# Patient Record
Sex: FEMALE | Race: WHITE | NOT HISPANIC OR LATINO | URBAN - NONMETROPOLITAN AREA
[De-identification: names, ages, dates, MRNs, and addresses within clinical notes are randomized per-mention and may not be internally consistent; named-entity substitution may affect disease eponyms.]

---

## 2023-08-29 ENCOUNTER — APPOINTMENT (OUTPATIENT)
Dept: PRIMARY CARE | Facility: CLINIC | Age: 23
End: 2023-08-29
Payer: COMMERCIAL

## 2023-09-01 NOTE — PROGRESS NOTES
Subjective   Patient ID: Nai Hutton is a 22 y.o. female who presents for Establish Care (Birth control refill; always in pain, joints; ).    HPI   Moved to [facility] in November.     Would like a refill on her birth control, Awilda     Multiple joint pains: She went and saw rheumatology before. Dr. Jimenez in [facility]. She gets more flair of pain in her body with exercise. Family members have fibromyalgia. She was given flexeril and found that it would interact with her medication and could cause seizure.   She has been doing PT. Doing stretches and this has not been helping too much.   She was told that she has jaw issues.   Hypermobility, having issues pain with flexion.   Feels like her head is very heavy all the time. Holding her head up all the time is hard.   Has seen a chiropractor in the past, gets minimal relief.   Has issues with tight    Ibuprofen 800mg and this is not helping much, would be open to trying flexeril     She does not see psych. Depression/anxiety.     Hx of Malignant Hyperthermia     All other systems reviewed and negative for complaint unless stated above.    Surgery: Hernia repair   Family: MH, Mother   smoker: none    Etoh: occasional   Drug use: none     Review of Systems   Constitutional:  Negative for chills and fever.   HENT:  Negative for congestion, ear pain and rhinorrhea.    Eyes:  Negative for discharge and redness.   Respiratory:  Negative for cough, shortness of breath and wheezing.    Cardiovascular:  Negative for chest pain and leg swelling.   Gastrointestinal:  Negative for abdominal pain, constipation, diarrhea, nausea and vomiting.   Genitourinary:  Negative for difficulty urinating, frequency and urgency.   Musculoskeletal:  Negative for gait problem.   Skin:  Negative for rash and wound.   Neurological:  Negative for dizziness, weakness and headaches.   Psychiatric/Behavioral:  Negative for confusion. The patient is not nervous/anxious.        Objective   BP  "118/70 (BP Location: Right arm, Patient Position: Sitting, BP Cuff Size: Adult)   Pulse 88   Ht 1.655 m (5' 5.16\")   Wt 72.6 kg (160 lb)   SpO2 99%   BMI 26.50 kg/m²     Physical Exam  Vitals reviewed.   Constitutional:       Appearance: Normal appearance.   HENT:      Head: Normocephalic.      Right Ear: Tympanic membrane, ear canal and external ear normal.      Left Ear: Tympanic membrane, ear canal and external ear normal.      Nose: No rhinorrhea.      Mouth/Throat:      Mouth: Mucous membranes are moist.      Pharynx: Oropharynx is clear.   Eyes:      Pupils: Pupils are equal, round, and reactive to light.   Cardiovascular:      Rate and Rhythm: Normal rate and regular rhythm.      Pulses: Normal pulses.   Pulmonary:      Effort: Pulmonary effort is normal.      Breath sounds: Normal breath sounds.   Abdominal:      General: Abdomen is flat. Bowel sounds are normal.      Palpations: Abdomen is soft.   Musculoskeletal:         General: No tenderness. Normal range of motion.      Right lower leg: No edema.      Left lower leg: No edema.      Comments: Hypermobility in wrists, can flex at waist with palms on floor    Lymphadenopathy:      Cervical: No cervical adenopathy.   Skin:     General: Skin is warm and dry.      Findings: No rash.   Neurological:      Mental Status: She is alert and oriented to person, place, and time.   Psychiatric:         Mood and Affect: Mood normal.         Behavior: Behavior normal.         Assessment/Plan   Diagnoses and all orders for this visit:  Annual physical exam  -     Comprehensive Metabolic Panel; Future  -     Lipid Panel; Future  Pain in other joint  -     TSH with reflex to Free T4 if abnormal; Future  -     CBC and Auto Differential; Future  -     Comprehensive Metabolic Panel; Future  -     RENATA + GLENDY Panel; Future  -     C-reactive protein; Future  -     Sedimentation Rate; Future  -     HLAB27 Typing; Future  -     cyclobenzaprine (Flexeril) 5 mg tablet; Take 1 " tablet (5 mg) by mouth as needed at bedtime for muscle spasms.  Encounter for surveillance of contraceptive pills  -     drospirenone-ethinyl estradioL (Kera Kaiser) 3-0.02 mg tablet; Take 1 tablet by mouth once daily.

## 2023-09-05 ENCOUNTER — OFFICE VISIT (OUTPATIENT)
Dept: PRIMARY CARE | Facility: CLINIC | Age: 23
End: 2023-09-05
Payer: COMMERCIAL

## 2023-09-05 VITALS
HEIGHT: 65 IN | BODY MASS INDEX: 26.66 KG/M2 | SYSTOLIC BLOOD PRESSURE: 118 MMHG | DIASTOLIC BLOOD PRESSURE: 70 MMHG | HEART RATE: 88 BPM | WEIGHT: 160 LBS | OXYGEN SATURATION: 99 %

## 2023-09-05 DIAGNOSIS — M25.59 PAIN IN OTHER JOINT: ICD-10-CM

## 2023-09-05 DIAGNOSIS — Z30.41 ENCOUNTER FOR SURVEILLANCE OF CONTRACEPTIVE PILLS: ICD-10-CM

## 2023-09-05 DIAGNOSIS — Z00.00 ANNUAL PHYSICAL EXAM: Primary | ICD-10-CM

## 2023-09-05 PROCEDURE — 99204 OFFICE O/P NEW MOD 45 MIN: CPT | Performed by: REGISTERED NURSE

## 2023-09-05 PROCEDURE — 1036F TOBACCO NON-USER: CPT | Performed by: REGISTERED NURSE

## 2023-09-05 RX ORDER — DROSPIRENONE AND ETHINYL ESTRADIOL 0.02-3(28)
KIT ORAL
COMMUNITY
End: 2023-09-07

## 2023-09-05 RX ORDER — DROSPIRENONE AND ETHINYL ESTRADIOL 0.02-3(28)
1 KIT ORAL DAILY
COMMUNITY
End: 2023-09-05 | Stop reason: SDUPTHER

## 2023-09-05 RX ORDER — LAMOTRIGINE 25 MG/1
TABLET ORAL
COMMUNITY
Start: 2023-03-07 | End: 2023-10-10 | Stop reason: ALTCHOICE

## 2023-09-05 RX ORDER — LAMOTRIGINE 100 MG/1
1 TABLET ORAL DAILY
COMMUNITY
Start: 2023-04-07 | End: 2023-10-10 | Stop reason: ALTCHOICE

## 2023-09-05 RX ORDER — BUPROPION HYDROCHLORIDE 300 MG/1
300 TABLET ORAL
COMMUNITY
Start: 2020-10-22 | End: 2023-10-10 | Stop reason: ALTCHOICE

## 2023-09-05 RX ORDER — FLUOXETINE HYDROCHLORIDE 20 MG/1
20 CAPSULE ORAL DAILY
COMMUNITY
Start: 2023-01-14 | End: 2023-10-10 | Stop reason: ALTCHOICE

## 2023-09-05 RX ORDER — DROSPIRENONE AND ETHINYL ESTRADIOL 0.02-3(28)
1 KIT ORAL DAILY
Qty: 28 TABLET | Refills: 12 | Status: SHIPPED | OUTPATIENT
Start: 2023-09-05 | End: 2023-09-07

## 2023-09-05 RX ORDER — BUSPIRONE HYDROCHLORIDE 5 MG/1
5 TABLET ORAL
COMMUNITY
Start: 2023-01-14 | End: 2023-10-10 | Stop reason: ALTCHOICE

## 2023-09-05 RX ORDER — LEVONORGESTREL / ETHINYL ESTRADIOL AND ETHINYL ESTRADIOL 150-30(84)
1 KIT ORAL DAILY
COMMUNITY
Start: 2021-11-18 | End: 2023-10-10 | Stop reason: ALTCHOICE

## 2023-09-05 RX ORDER — CYCLOBENZAPRINE HCL 5 MG
5 TABLET ORAL NIGHTLY PRN
Qty: 30 TABLET | Refills: 0 | Status: SHIPPED | OUTPATIENT
Start: 2023-09-05 | End: 2023-11-10 | Stop reason: ALTCHOICE

## 2023-09-05 RX ORDER — ALBUTEROL SULFATE 90 UG/1
AEROSOL, METERED RESPIRATORY (INHALATION)
COMMUNITY
Start: 2022-10-04 | End: 2023-10-10 | Stop reason: ALTCHOICE

## 2023-09-05 ASSESSMENT — ENCOUNTER SYMPTOMS
COUGH: 0
DIARRHEA: 0
NERVOUS/ANXIOUS: 0
ABDOMINAL PAIN: 0
FEVER: 0
CHILLS: 0
WOUND: 0
EYE REDNESS: 0
CONSTIPATION: 0
HEADACHES: 0
WHEEZING: 0
RHINORRHEA: 0
CONFUSION: 0
DIFFICULTY URINATING: 0
VOMITING: 0
WEAKNESS: 0
EYE DISCHARGE: 0
FREQUENCY: 0
DIZZINESS: 0
NAUSEA: 0
SHORTNESS OF BREATH: 0

## 2023-09-07 ENCOUNTER — LAB (OUTPATIENT)
Dept: LAB | Facility: LAB | Age: 23
End: 2023-09-07
Payer: COMMERCIAL

## 2023-09-07 DIAGNOSIS — Z30.41 ENCOUNTER FOR SURVEILLANCE OF CONTRACEPTIVE PILLS: ICD-10-CM

## 2023-09-07 DIAGNOSIS — Z00.00 ANNUAL PHYSICAL EXAM: ICD-10-CM

## 2023-09-07 DIAGNOSIS — M25.59 PAIN IN OTHER JOINT: ICD-10-CM

## 2023-09-07 LAB
ALANINE AMINOTRANSFERASE (SGPT) (U/L) IN SER/PLAS: 10 U/L (ref 7–45)
ALBUMIN (G/DL) IN SER/PLAS: 4 G/DL (ref 3.4–5)
ALKALINE PHOSPHATASE (U/L) IN SER/PLAS: 38 U/L (ref 33–110)
ANION GAP IN SER/PLAS: 9 MMOL/L (ref 10–20)
ASPARTATE AMINOTRANSFERASE (SGOT) (U/L) IN SER/PLAS: 14 U/L (ref 9–39)
BASOPHILS (10*3/UL) IN BLOOD BY AUTOMATED COUNT: 0.04 X10E9/L (ref 0–0.1)
BASOPHILS/100 LEUKOCYTES IN BLOOD BY AUTOMATED COUNT: 1 % (ref 0–2)
BILIRUBIN TOTAL (MG/DL) IN SER/PLAS: 0.3 MG/DL (ref 0–1.2)
C REACTIVE PROTEIN (MG/L) IN SER/PLAS: 0.67 MG/DL
CALCIUM (MG/DL) IN SER/PLAS: 8.9 MG/DL (ref 8.6–10.3)
CARBON DIOXIDE, TOTAL (MMOL/L) IN SER/PLAS: 29 MMOL/L (ref 21–32)
CHLORIDE (MMOL/L) IN SER/PLAS: 102 MMOL/L (ref 98–107)
CHOLESTEROL (MG/DL) IN SER/PLAS: 182 MG/DL (ref 0–199)
CHOLESTEROL IN HDL (MG/DL) IN SER/PLAS: 59.9 MG/DL
CHOLESTEROL/HDL RATIO: 3
CREATININE (MG/DL) IN SER/PLAS: 0.71 MG/DL (ref 0.5–1.05)
EOSINOPHILS (10*3/UL) IN BLOOD BY AUTOMATED COUNT: 0.23 X10E9/L (ref 0–0.7)
EOSINOPHILS/100 LEUKOCYTES IN BLOOD BY AUTOMATED COUNT: 5.8 % (ref 0–6)
ERYTHROCYTE DISTRIBUTION WIDTH (RATIO) BY AUTOMATED COUNT: 12.2 % (ref 11.5–14.5)
ERYTHROCYTE MEAN CORPUSCULAR HEMOGLOBIN CONCENTRATION (G/DL) BY AUTOMATED: 32.5 G/DL (ref 32–36)
ERYTHROCYTE MEAN CORPUSCULAR VOLUME (FL) BY AUTOMATED COUNT: 90 FL (ref 80–100)
ERYTHROCYTES (10*6/UL) IN BLOOD BY AUTOMATED COUNT: 4.32 X10E12/L (ref 4–5.2)
GFR FEMALE: >90 ML/MIN/1.73M2
GLUCOSE (MG/DL) IN SER/PLAS: 75 MG/DL (ref 74–99)
HEMATOCRIT (%) IN BLOOD BY AUTOMATED COUNT: 38.8 % (ref 36–46)
HEMOGLOBIN (G/DL) IN BLOOD: 12.6 G/DL (ref 12–16)
IMMATURE GRANULOCYTES/100 LEUKOCYTES IN BLOOD BY AUTOMATED COUNT: 0 % (ref 0–0.9)
LDL: 103 MG/DL (ref 0–119)
LEUKOCYTES (10*3/UL) IN BLOOD BY AUTOMATED COUNT: 4 X10E9/L (ref 4.4–11.3)
LYMPHOCYTES (10*3/UL) IN BLOOD BY AUTOMATED COUNT: 1.83 X10E9/L (ref 1.2–4.8)
LYMPHOCYTES/100 LEUKOCYTES IN BLOOD BY AUTOMATED COUNT: 45.9 % (ref 13–44)
MONOCYTES (10*3/UL) IN BLOOD BY AUTOMATED COUNT: 0.45 X10E9/L (ref 0.1–1)
MONOCYTES/100 LEUKOCYTES IN BLOOD BY AUTOMATED COUNT: 11.3 % (ref 2–10)
NEUTROPHILS (10*3/UL) IN BLOOD BY AUTOMATED COUNT: 1.44 X10E9/L (ref 1.2–7.7)
NEUTROPHILS/100 LEUKOCYTES IN BLOOD BY AUTOMATED COUNT: 36 % (ref 40–80)
PLATELETS (10*3/UL) IN BLOOD AUTOMATED COUNT: 282 X10E9/L (ref 150–450)
POTASSIUM (MMOL/L) IN SER/PLAS: 4.2 MMOL/L (ref 3.5–5.3)
PROTEIN TOTAL: 6.6 G/DL (ref 6.4–8.2)
SEDIMENTATION RATE, ERYTHROCYTE: 3 MM/H (ref 0–20)
SODIUM (MMOL/L) IN SER/PLAS: 136 MMOL/L (ref 136–145)
THYROTROPIN (MIU/L) IN SER/PLAS BY DETECTION LIMIT <= 0.05 MIU/L: 1.7 MIU/L (ref 0.44–3.98)
TRIGLYCERIDE (MG/DL) IN SER/PLAS: 96 MG/DL (ref 0–149)
UREA NITROGEN (MG/DL) IN SER/PLAS: 13 MG/DL (ref 6–23)
VLDL: 19 MG/DL (ref 0–40)

## 2023-09-07 PROCEDURE — 86140 C-REACTIVE PROTEIN: CPT

## 2023-09-07 PROCEDURE — 80053 COMPREHEN METABOLIC PANEL: CPT

## 2023-09-07 PROCEDURE — 86235 NUCLEAR ANTIGEN ANTIBODY: CPT

## 2023-09-07 PROCEDURE — 80061 LIPID PANEL: CPT

## 2023-09-07 PROCEDURE — 86038 ANTINUCLEAR ANTIBODIES: CPT

## 2023-09-07 PROCEDURE — 81381 HLA I TYPING 1 ALLELE HR: CPT

## 2023-09-07 PROCEDURE — 36415 COLL VENOUS BLD VENIPUNCTURE: CPT

## 2023-09-07 PROCEDURE — 85025 COMPLETE CBC W/AUTO DIFF WBC: CPT

## 2023-09-07 PROCEDURE — 84443 ASSAY THYROID STIM HORMONE: CPT

## 2023-09-07 PROCEDURE — 85652 RBC SED RATE AUTOMATED: CPT

## 2023-09-07 PROCEDURE — 86225 DNA ANTIBODY NATIVE: CPT

## 2023-09-07 RX ORDER — DROSPIRENONE AND ETHINYL ESTRADIOL 0.02-3(28)
1 KIT ORAL DAILY
Qty: 90 TABLET | Refills: 3 | Status: SHIPPED | OUTPATIENT
Start: 2023-09-07 | End: 2023-10-24 | Stop reason: SDUPTHER

## 2023-09-08 LAB
ANTI-CENTROMERE: <0.2 AI
ANTI-CHROMATIN: <0.2 AI
ANTI-DNA (DS): 1 IU/ML
ANTI-JO-1 IGG: <0.2 AI
ANTI-NUCLEAR ANTIBODY (ANA): NEGATIVE
ANTI-RIBOSOMAL P: <0.2 AI
ANTI-RNP: 0.3 AI
ANTI-SCL-70: <0.2 AI
ANTI-SM/RNP: <0.2 AI
ANTI-SM: <0.2 AI
ANTI-SSA: <0.2 AI
ANTI-SSB: <0.2 AI

## 2023-09-12 DIAGNOSIS — D72.9 ABNORMAL WHITE BLOOD CELL (WBC) COUNT: Primary | ICD-10-CM

## 2023-09-12 LAB — HLAB27 TYPING: NEGATIVE

## 2023-09-26 DIAGNOSIS — M24.9 HYPERMOBILITY OF JOINT: ICD-10-CM

## 2023-09-26 DIAGNOSIS — M25.59 PAIN IN OTHER JOINT: Primary | ICD-10-CM

## 2023-10-09 PROBLEM — I73.00 RAYNAUD DISEASE: Status: ACTIVE | Noted: 2023-01-01

## 2023-10-09 PROBLEM — F31.81 BIPOLAR 2 DISORDER (MULTI): Status: ACTIVE | Noted: 2019-11-13

## 2023-10-09 PROBLEM — N92.6 MENSTRUAL PERIODS IRREGULAR: Status: ACTIVE | Noted: 2019-11-13

## 2023-10-09 PROBLEM — M79.7 FIBROMYALGIA: Status: ACTIVE | Noted: 2023-04-05

## 2023-10-09 PROBLEM — R19.8 IRREGULAR BOWEL HABITS: Status: ACTIVE | Noted: 2019-09-17

## 2023-10-09 PROBLEM — N94.10 DYSPAREUNIA IN FEMALE: Status: ACTIVE | Noted: 2019-11-13

## 2023-10-09 PROBLEM — A60.9 ANOGENITAL HERPESVIRAL INFECTION: Status: ACTIVE | Noted: 2021-11-03

## 2023-10-09 PROBLEM — K21.9 GERD (GASTROESOPHAGEAL REFLUX DISEASE): Status: ACTIVE | Noted: 2019-09-17

## 2023-10-09 PROBLEM — F41.8 ANXIETY WITH DEPRESSION: Status: ACTIVE | Noted: 2023-02-16

## 2023-10-10 ENCOUNTER — OFFICE VISIT (OUTPATIENT)
Dept: GENETICS | Facility: HOSPITAL | Age: 23
End: 2023-10-10
Payer: COMMERCIAL

## 2023-10-10 VITALS
WEIGHT: 158.95 LBS | DIASTOLIC BLOOD PRESSURE: 69 MMHG | TEMPERATURE: 99.2 F | HEIGHT: 62 IN | RESPIRATION RATE: 20 BRPM | SYSTOLIC BLOOD PRESSURE: 113 MMHG | HEART RATE: 93 BPM | BODY MASS INDEX: 29.25 KG/M2

## 2023-10-10 DIAGNOSIS — M25.59 PAIN IN OTHER JOINT: Primary | ICD-10-CM

## 2023-10-10 DIAGNOSIS — M24.9 HYPERMOBILITY OF JOINT: ICD-10-CM

## 2023-10-10 DIAGNOSIS — R19.8 IRREGULAR BOWEL HABITS: ICD-10-CM

## 2023-10-10 DIAGNOSIS — F31.81 BIPOLAR 2 DISORDER (MULTI): ICD-10-CM

## 2023-10-10 DIAGNOSIS — M79.7 FIBROMYALGIA: ICD-10-CM

## 2023-10-10 DIAGNOSIS — R10.2 PELVIC PAIN: ICD-10-CM

## 2023-10-10 DIAGNOSIS — I73.00 RAYNAUD'S DISEASE WITHOUT GANGRENE: ICD-10-CM

## 2023-10-10 PROCEDURE — 99205 OFFICE O/P NEW HI 60 MIN: CPT | Performed by: MEDICAL GENETICS

## 2023-10-10 PROCEDURE — 1036F TOBACCO NON-USER: CPT | Performed by: MEDICAL GENETICS

## 2023-10-10 PROCEDURE — 99215 OFFICE O/P EST HI 40 MIN: CPT | Performed by: MEDICAL GENETICS

## 2023-10-10 NOTE — LETTER
10/11/23    PIERRE Valadez-CNP  [address] [facility] [address]      Dear PIERRE Grossman-CNP,    Thank you for referring your patient, Nai Hutton, to receive care in my office. I have enclosed a summary of the care provided to Nai on 10/11/23.    Please contact me with any questions you may have regarding the visit.    Sincerely,         Brittani Grande MD  [address] [address] [facility] [address]  [facility] [address]    CC: No Recipients

## 2023-10-10 NOTE — LETTER
10/11/23    MALORIE Valadez  [address] [facility] [address] [facility] [address]      Dear MALORIE Grossman,    I am writing to confirm that your patient, Nai Hutton  received care in my office on 10/11/23. I have enclosed a summary of the care provided to Nai for your reference.    Please contact me with any questions you may have regarding the visit.    Sincerely,         Brittani Grande MD  [address]  [facility] [address]  [facility] [address]    CC: No Recipients

## 2023-10-10 NOTE — PROGRESS NOTES
"MEDICAL GENETICS INITIAL VISIT NOTE    History of present illness:  The patient is being seen at the request of Dr. Mosqueda for genetic counseling and genetic evaluation.    -PT eval brought up the concern for hEDS.     -Always felt back pain since a young age, got sick easier. MRI showed spina bifida occulata ordered by Neuro due to tingling in hands. Told that this was not the reason for her issues.   GERD diagnosed early, meds did not help.  At 13y migraines monthly- put on OCP and migraine went away.  Cramps and pelvic pain all the time- tried pelvic floor therapy without any help  At 14y bladder does not feel it empty, has had incontinence    Fatigue all the time    Neck pain     Was diagnosed with bipolar, but patient thinks it was not a real diagnosis, thinks it could be autism.  Tried meds, DBT and bio feedback that did not help and currently not on any meds. Does not have a counselor or psychiatrist.    Feels that she has a hard time speaking to people.    Specialists/Previous Evaluations:  Rheum at [facility]-diagnosed with fibromyalgia, chronic fatigue syndrome    Surgeries/Hospitalizations:  2022 tubal ligation  Aug 2019- inpatient psych      Birth History:  GA: full term  Age of Mother: 21 ()  Age of Father: 21  Pregnancy: There were no abnormal ultrasounds or prenatal chromosomal screening results.   There were exposures to powdered metal,  no alcohol, tobacco, or street drug exposures in utero.  Delivery History:  born via vaginal  There were no delivery complications.  weighing normal   born at [facility] in [place].  -did not spend any time in the NICU.  M[place]ap Screen: Normal per report     Developmental history:  Wobbly and hit head a lot  Talked \"late\"   Grade HS, Associates degree in liberal arts  No regression.     Social History: lives with boyfriend.  works as a      Family History:   (paternal) and  (maternal) ethnicity.     Family history was " reviewed and the following concerns were apparent:  Father (45 years old)-healthy  Mother ( 45 years old)- fibromyalgia, fatigue, stomach ulcers, period issues.   Paternal half brother (7 years old)- healthy  MGF twin sister had malignant hyperthermia diagnosed with muscle biopsy as did her MGF. Her mother also carries the diagnosis.      The remainder of the family history was negative for birth defects, intellectual disability, recurrent pregnancy loss, or recognized inherited conditions. Consanguinity was denied. Ashkenazi Sabianism Ancestry was denied. The Pedigree is available for a full review of the family history.     PCP: Viktoriya Andujar    ROS: Pertinent negative and positive history related to inherited connective tissue disorders are as followings:     Musculoskeletal  - Hypermobility: Yes  - Joint laxity: Yes  - Joint dislocation: Yes  - Chronic widespread musculoskeletal pain: Yes  - Tendon/muscle rupture:  No  - Fractures: No  - Scoliosis: No mild  - Pectus differences: No  - Flat feet: No     Skin  - Spontaneous skin separation: No  - Easy bruising: Yes  - Skin hyperextensibility: No  - Stretch marks not related to weight change: No  - Abnormal scar formation, atrophic scar, wound dehiscence: No     Dental  - Dental crowding: Yes  - Gingival disease: Yes     Cardiopulmonary:  - Orthostatic intolerance: No  - Pneumothorax:  no  - Echocardiogram: No   - Spontaneous rupture/aneurysm/dissection of blood vessels: No  - chest pain  - heart rate goes up fast    GI/  - IBS-like symptoms: Yes seen by GI in 2016, endoscope was normal  - Hernia:  yes, not recurrent  - Rectal prolapse: No   - Pelvic/uterine prolapse: No  - Spontaneous rupture of internal organs: No  - Obstetric complications (e.g., significant vaginal tear, or postpartum hemorrhage): Not applicable    Ocular/ENT  - Lens dislocation:  No  - High myopia:  No  - Hearing loss:  Yes, not tested  - Tinnitus:  Yes    Neuropsychiatric:  -  Migraines/headaches:  Yes  - Neuropathy:  Yes    Pediatric history: No congenital hip dislocation, congenital club feet, hypotonia, developmental delay, autism.    Other medical issues include no       Physical examination:  Arm span to height ratio: 154/156- normal   Upper segment to lower segment ratio: not done  GENERAL: The patient is alert, in no apparent distress.  Head shape is normal.  Face: No malar hypoplasia. Forehead, nose, philthrum, and chin appear normal.  Eyes: Not deep set. Palpebral fissures normally positioned. Sclerae not blue or icteric.   Ears: Not dysplastic, posteriorly rotated, or low set.  Oral cavity: High arched palate. Yes crowding. Normal uvula, normal dentition, no receding gum line or gingivitis.  CHEST/LUNGS: No pectus differences.   ABDOMEN: No abdominal wall defect.   No arachnodactyly. Wrist signs negative. Thumb signs negative. Yes piezogenic papules. Acrogeria-. No joint contractures. No pretibial plaque. Hindfoot deformity no-, scoliosis- by forward bending test. No. No peripheral edema. No varicose veins.   SKIN: Stretch marks. Skin hyperextensibility no, no bruising, skin consistency not doughy or velvety. Not translucent. Scar keloid   Psychiatric: Cooperative. Appropriate mood and affect.     Beighton score for generalized joint hypermobility  1. Passive dorsiflexion and hyperextension of the fifth MCP joint beyond 90°: 2  2. Passive apposition of the thumb to the flexor aspect of the forearm: 2  3. Passive hyperextension of the elbow beyond 10°:  0  4. Passive hyperextension of the knee beyond 10°:  0  5. Active forward flexion of the trunk with the knees fully extended so that the palms of the hands rest flat on the floor:  1  Total  5/9 (where a score of equal to or more than  0 is considered positive for age)     Systemic score for Marfan syndrome is  0 where a score of equal to or more than 7 is considered positive systemic score.     Assessment:  Nai Ley  Brennen is a 22 y.o. female with phenotypes as outlined in the HPI and PE sections. Personal history is notable for pain and fatigue. Family history is notable for similar issues in mother.     There are 13 subtypes of Yolie Danlos Syndrome.     Classical EDS is typically characterized by skin hyperextensibility (absent), widened, atrophic scar (absent), and joint hypermobility as defined as at least 5 of 9 on Beighton scale (present). Minor features include abnormal wound healing (absent), velvet skin (absent), dislocations (absent), molluscoid pseudotumors and subcutaneous spheroids (absent), hypotonia, easy bruising (present), and surgical complications. Less common findings are mitral valve and tricuspid valve prolapse, aortic root dilation, and spontaneous rupture of large arteries. Classic Yolie-Danlos syndrome (cEDS) should be suspected in individuals with both of the following: Major criteria skin hyperextensibility and atrophic scarring with =3 minor criteria. About 90% the patients with Classical EDS will have mutations in COL5A1 or COL5A2. You have one of the concerning major features of Classic EDS. It is inherited in an autosomal dominant fashion. Affected individuals have an affected parent 50% of the time.    Vascular EDS is typically characterized by arterial rupture, intestinal rupture, uterine rupture during pregnancy, and a positive family history. You do not have a history of any of these features. Vascular EDS can be suspected when several of the minor features are present: thin, translucent skin, characteristic facial features including thin lips and philtrum, small chin, thin nose, and large eyes, acrogeria (premature aging of skin), arteriovenous carotid-cavernous sinus fistula, tendon or muscle rupture, early onset varicose veins, small joint hypermobility, pneumothorax/pneumohemothorax, easy bruising, chronic subluxation or dislocations, congenital hip dislocations, club feet, and  gingival recession. Of these features, you have  ??small joint hypermobility, and easy bruising. Vascular EDS is characterized by mutations in COL3A1 in at least 95% of patients while 2% have deletions in the gene. It is also inherited in an autosomal dominant fashion. 25% of people with vEDS have symptoms by age 20 and 80% will have symptoms by age 40. Your family history does not support this diagnosis. 50 % of individuals have a family history.     Hypermobile EDS (hEDS) is the most common subtype. The diagnostic criteria were revised in 2017 and requires all criterion should be met to establish a diagnosis of EDS, hypermobility type. These revised criteria are more strict than previous criteria. The genetic basis for hEDS is not known, so there is no genetic testing for hEDS. You do have features of hEDS, but do not fulfill criteria at this time.    Hypermobility spectrum disorders (HSD) are a group of conditions related to joint hypermobility (JH). Some people are hypermobile in all joints, some in large and some in small joints. This condition is thought to be distinct from hEDS, but there is no genetic testing for HSD. https://www.christy-danlos.com/what-is-hsd/ information provided.    The remaining subtypes have some defining features that are not present.    Some people with hypermobility can have issues with their heart, so we recommend a referral to cardiology for an echocardiogram.    If someone meets criteria for hEDS, the treatment is still symptomatic, so if pain is a main concern, can consider pain medicine referral. We discussed that physical therapy and aquatherapy can be helpful for pain management as well.    Genetic testing is an option. When someone does not fulfil clinical criteria, the chance for a positive result is low. There are also the chance for uncertain results.     Chronic pain can lead to issues with mental health. We recommend consideration of mental health consult. This is not to  say that the chronic pain is imagined and not real.     Plan:    Refer to cardiology for echo  Discuss with PCP pain management referral  National consultation service at [facility] information provided  Neuropsych testing for autism concern can be considered      Thank you for allowing me to participate in the care of this patient. Please do not hesitate to contact me if you have any questions.    Sincerely,     Brittani Grande MD  Medical Geneticist  [facility]  Phone: [phone]  Fax: [phone]   Address: [address] [place] [address]     ADDENDUM:    The family history was concerning for Malignant Hyperthermia  - We discussed that malignant hyperthermia (MH) is a condition of uncontrolled muscle contractions that can happen during or after a susceptible person receives a triggering drug.   - This can lead to elevation of body temperature, and dangerous muscle breakdown, with risk for heart rhythm disturbances, kidney damage, and muscle damage. This condition can sometimes be fatal.  - These episodes can usually be avoided by avoiding the triggering drugs, most of which are anesthetic agents.  - If it occurs, it can be treated with a medication called dantrolene.   - People who are susceptible to MH may not experience MH every time they receive anesthesia, and it is difficult to predict when they will have an episode.  - Therefore, AVOIDANCE OF THE TRIGGERING DRUGS IS RECOMMENDED ONCE THE SUSCEPTIBILITY IS KNOWN.  - This information should be shared with every surgeon and anesthesiologist treating you.   - It is generally recommended that people with MH susceptibility wear a medical alert bracelet in case they are unable to communicate their needs during a health emergency.     - Malignant hyperthermia susceptibility (MHS) is an autosomal dominant disorder (only need one harmful gene change to have the disorder).   - Most individuals diagnosed with MHS have a  parent with MHS, although the parent may not have experienced an episode of MH.   - The proportion of individuals with MHS caused by a de celena pathogenic variant (brand new harmful gene change) is unknown.   - Each child of an individual with MHS has a 50% chance of inheriting a causative gene change and the susceptibility to MH.  - There are three known genes that can have harmful changes and cause MHS.   - These three genes only account for ~70% of MHS, thus we discussed how there can be other variables or genes that we do not know yet that can cause an increased risk for these reactions/symptoms.   - Genetic testing is NOT able to detect a mutation in ~30% of individuals with MHS.    - We discussed that genetic testing of an affected individual is most informative. At this time, Nai should be treated as if she has malignant hyperthermia.   - She should have surgery at a location that meets the following criteria (according to the established guidelines by Malignant Hyperthermia Association of the [place] (MHAUS) and accrediting organizations):  ---- The facilities should be prepared to recognize and treat an MH crisis according to the established guidelines by (MHAUS) and accrediting organizations.  ---- Dantrolene should be accessible within ten minutes of the first signs of MH, and the facility should have the capacity to administer at least 10mg/kg of dantrolene in the event of an acute MH episode requiring multiple dantrolene doses to abort the crisis.  ---- The anesthesia machine should be flushed according to its specific manufacture's recommendations and/or charcoal filters placed on both inspiratory and expiratory limbs to minimize residual volatile agent in the circuit   ---- There should be a formal agreement in place between ambulatory surgery centers and hospitals for transfer of patients to higher care after a suspected MH episode.    - Testing is available through the [facility] laboratory for  IBIMP5Q, RYR1, and STAC3 genes.  The results may be positive, negative, or inconclusive.   - Testing takes 2 to 3 weeks.

## 2023-10-11 PROBLEM — R52 PAIN: Status: RESOLVED | Noted: 2023-10-11 | Resolved: 2023-10-11

## 2023-10-11 PROBLEM — M24.80 JOINT HYPEREXTENSIBILITY OF MULTIPLE SITES: Status: RESOLVED | Noted: 2023-10-11 | Resolved: 2023-10-11

## 2023-10-11 PROBLEM — M24.80 JOINT HYPEREXTENSIBILITY OF MULTIPLE SITES: Status: ACTIVE | Noted: 2023-10-11

## 2023-10-11 PROBLEM — M25.50 JOINT PAIN: Status: ACTIVE | Noted: 2023-10-11

## 2023-10-11 PROBLEM — R10.2 PELVIC PAIN: Status: ACTIVE | Noted: 2023-10-11

## 2023-10-11 PROBLEM — R52 PAIN: Status: ACTIVE | Noted: 2023-10-11

## 2023-10-13 DIAGNOSIS — M35.7 HYPERMOBILITY SYNDROME: ICD-10-CM

## 2023-10-19 ASSESSMENT — ENCOUNTER SYMPTOMS
BACK PAIN: 1
DYSURIA: 0
TINGLING: 1
HEADACHES: 1
PARESIS: 0
PARESTHESIAS: 0
WEIGHT LOSS: 0
PERIANAL NUMBNESS: 0
BOWEL INCONTINENCE: 0
NUMBNESS: 0
WEAKNESS: 0
LEG PAIN: 1
ABDOMINAL PAIN: 0
FEVER: 0

## 2023-10-20 NOTE — PROGRESS NOTES
Subjective   Patient ID: Nai Hutton is a 22 y.o. female who presents for Follow-up (Discuss blood work and flexeril; issues with bowls, either constipation or diarrhea; ).    Back Pain  This is a chronic problem. The current episode started more than 1 year ago. The problem occurs daily. The problem has been waxing and waning since onset. The pain is present in the thoracic spine. The quality of the pain is described as aching. The pain does not radiate. The pain is at a severity of 4/10. The pain is The same all the time. The symptoms are aggravated by position and sitting. Stiffness is present All day. Associated symptoms include headaches, leg pain and tingling. Pertinent negatives include no abdominal pain, bladder incontinence, bowel incontinence, chest pain, dysuria, fever, numbness, paresis, paresthesias, pelvic pain, perianal numbness, weakness or weight loss. Risk factors include poor posture.      Moved to [facility] in November.    She might be losing her insurance at the end of the year   Would like a refill on her birth control, Awilda      Hypermobility syndrome: She did see . They were unclear of ELD. She needs to get echo done. She had this scheduled but it was at [facility]. They did not want to finalize dx.   She has fluctuations in her heart rate. She is getting readings of 160 at times with minimal exertion. She does not feel like she is going to pass out.     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 She feels sob and she has felt like she is going to pass out with standing up. She was told to be evaluated with cardiology and possible Echo r/t hypermobility syndrome.     Previously:   Multiple joint pains: She went and saw rheumatology before. Dr. Jimenez in [facility]. She gets more flair of pain in her body with exercise. Family members have fibromyalgia. She was given flexeril and found that it would interact with her medication and could cause seizure.    She has been doing PT. Doing stretches and this has not been helping too much. She was told that she has jaw issues.   Hypermobility, having issues pain with flexion.   Feels like her head is very heavy all the time. Holding her head up all the time is hard. Has seen a chiropractor in the past, gets minimal relief.      Ibuprofen 800mg and this is not helping much, would be open to trying flexeril     Bouts of constipation and diarrhea.  Encouraged water intake. Encouraged fiber. She will be constipated for 5 days and then she will have diarrhea. Some mucus in her stool. Slimy. She had grainy stool today. Denies blood, today it was dark. Has always been irregular. She has always gone about 4 days. Worse over the past 4 months. She has tried fiber supplements in the past. She does not drink a lot of water. She has tried to change her diet. She tried factor meals. She a slight accident recently. She has done PT for her pelvic floor.   States that she has not tried a stool softener, she gets diarrhea.   She saw GI before. She thought she had celiac and was checked for this   She took prevacid in the past      She does not see psych. Depression/anxiety.      Hx of Malignant Hyperthermia      All other systems reviewed and negative for complaint unless stated above.     Surgery: Hernia repair   Family: MH, Mother   smoker: none     Etoh: occasional   Drug use: none       Review of Systems   Constitutional:  Negative for chills, fever and weight loss.   HENT:  Negative for congestion, ear pain and rhinorrhea.    Eyes:  Negative for discharge and redness.   Respiratory:  Negative for cough, shortness of breath and wheezing.    Cardiovascular:  Negative for chest pain and leg swelling.   Gastrointestinal:  Negative for abdominal pain, bowel incontinence, constipation, diarrhea, nausea and vomiting.   Genitourinary:  Negative for bladder incontinence, difficulty urinating, dysuria, frequency, pelvic pain and urgency.  "  Musculoskeletal:  Positive for back pain. Negative for gait problem.   Skin:  Negative for rash and wound.   Neurological:  Positive for tingling and headaches. Negative for dizziness, weakness, numbness and paresthesias.   Psychiatric/Behavioral:  Negative for confusion. The patient is not nervous/anxious.        Objective   /72 (BP Location: Right arm, Patient Position: Sitting, BP Cuff Size: Adult)   Pulse 73   Ht 1.585 m (5' 2.4\")   Wt 74.1 kg (163 lb 6.4 oz)   BMI 29.50 kg/m²     Physical Exam  Constitutional:       Appearance: Normal appearance.   Cardiovascular:      Rate and Rhythm: Normal rate and regular rhythm.   Pulmonary:      Effort: Pulmonary effort is normal.      Breath sounds: Normal breath sounds.   Skin:     General: Skin is warm and dry.   Neurological:      Mental Status: She is alert and oriented to person, place, and time. Mental status is at baseline.   Psychiatric:         Mood and Affect: Mood normal.         Behavior: Behavior normal.         Assessment/Plan       #Hypermobility syndrome   Getting echo for heart issues   Can continue flexeril     #GI issues   Discussed diet  Encouraged water  Encouraged fiber  FODMAP diet provided   May need to see GI in the future   Trial stool softeners for 1 week to help regulate bm cycle         Encounter for surveillance of contraceptive pills  -     drospirenone-ethinyl estradioL (Kera Kaiser) 3-0.02 mg tablet; Take 1 tablet by mouth once daily.      Answers submitted by the patient for this visit:  Back Pain Questionnaire (Submitted on 10/19/2023)  Chief Complaint: Back pain  Chronicity: chronic  Onset: more than 1 year ago  Frequency: daily  Progression since onset: waxing and waning  Pain location: thoracic spine  Pain quality: aching  Radiates to: does not radiate  Pain - numeric: 4/10  Pain is: the same all the time  Aggravated by: position, sitting  Stiffness is present: all day  abdominal pain: No  bladder incontinence: No  bowel " incontinence: No  chest pain: No  dysuria: No  fever: No  headaches: Yes  leg pain: Yes  numbness: No  paresis: No  paresthesias: No  pelvic pain: No  perianal numbness: No  tingling: Yes  weakness: No  weight loss: No  Risk factors: poor posture

## 2023-10-24 ENCOUNTER — OFFICE VISIT (OUTPATIENT)
Dept: PRIMARY CARE | Facility: CLINIC | Age: 23
End: 2023-10-24
Payer: COMMERCIAL

## 2023-10-24 VITALS
HEART RATE: 73 BPM | HEIGHT: 62 IN | DIASTOLIC BLOOD PRESSURE: 72 MMHG | BODY MASS INDEX: 30.07 KG/M2 | SYSTOLIC BLOOD PRESSURE: 115 MMHG | WEIGHT: 163.4 LBS

## 2023-10-24 DIAGNOSIS — R19.8 IRREGULAR BOWEL HABITS: Primary | ICD-10-CM

## 2023-10-24 DIAGNOSIS — Z30.41 ENCOUNTER FOR SURVEILLANCE OF CONTRACEPTIVE PILLS: ICD-10-CM

## 2023-10-24 DIAGNOSIS — I49.9 IRREGULAR HEART RATE: ICD-10-CM

## 2023-10-24 PROCEDURE — 99214 OFFICE O/P EST MOD 30 MIN: CPT | Performed by: REGISTERED NURSE

## 2023-10-24 PROCEDURE — 1036F TOBACCO NON-USER: CPT | Performed by: REGISTERED NURSE

## 2023-10-24 RX ORDER — DROSPIRENONE AND ETHINYL ESTRADIOL 0.02-3(28)
1 KIT ORAL DAILY
Qty: 90 TABLET | Refills: 0 | Status: SHIPPED | OUTPATIENT
Start: 2023-10-24 | End: 2023-10-24 | Stop reason: SDUPTHER

## 2023-10-24 RX ORDER — DROSPIRENONE AND ETHINYL ESTRADIOL 0.02-3(28)
1 KIT ORAL DAILY
Qty: 28 TABLET | Refills: 12 | Status: SHIPPED | OUTPATIENT
Start: 2023-10-24 | End: 2024-02-16 | Stop reason: SINTOL

## 2023-10-24 NOTE — PATIENT INSTRUCTIONS
Radiology:  [place]:  [phone]     Central Scheduling:  Radiology: [phone]  ECHO, [facility]: [phone]    [facility] [phone]    Cardiology:   Jong Witt () [phone]  Rubina Escalera () [phone]  Dayna Galvez () [phone]

## 2023-10-25 ASSESSMENT — ENCOUNTER SYMPTOMS
NERVOUS/ANXIOUS: 0
CONSTIPATION: 0
ABDOMINAL PAIN: 0
DYSURIA: 0
SHORTNESS OF BREATH: 0
PARESTHESIAS: 0
LEG PAIN: 1
VOMITING: 0
RHINORRHEA: 0
WHEEZING: 0
HEADACHES: 1
BOWEL INCONTINENCE: 0
DIZZINESS: 0
NUMBNESS: 0
CONFUSION: 0
EYE REDNESS: 0
TINGLING: 1
NAUSEA: 0
PERIANAL NUMBNESS: 0
DIFFICULTY URINATING: 0
COUGH: 0
CHILLS: 0
FREQUENCY: 0
BACK PAIN: 1
EYE DISCHARGE: 0
WOUND: 0
WEIGHT LOSS: 0
PARESIS: 0
DIARRHEA: 0
WEAKNESS: 0
FEVER: 0

## 2023-11-09 ENCOUNTER — APPOINTMENT (OUTPATIENT)
Dept: PEDIATRIC CARDIOLOGY | Facility: HOSPITAL | Age: 23
End: 2023-11-09
Payer: COMMERCIAL

## 2023-11-10 ENCOUNTER — OFFICE VISIT (OUTPATIENT)
Dept: CARDIOLOGY | Facility: CLINIC | Age: 23
End: 2023-11-10
Payer: COMMERCIAL

## 2023-11-10 VITALS
WEIGHT: 160 LBS | BODY MASS INDEX: 28.89 KG/M2 | DIASTOLIC BLOOD PRESSURE: 79 MMHG | SYSTOLIC BLOOD PRESSURE: 129 MMHG | HEART RATE: 88 BPM | OXYGEN SATURATION: 99 %

## 2023-11-10 DIAGNOSIS — F31.81 BIPOLAR 2 DISORDER (MULTI): ICD-10-CM

## 2023-11-10 DIAGNOSIS — R07.9 CHEST PAIN, UNSPECIFIED TYPE: ICD-10-CM

## 2023-11-10 DIAGNOSIS — M25.59 PAIN IN OTHER JOINT: ICD-10-CM

## 2023-11-10 DIAGNOSIS — M24.9 HYPERMOBILITY OF JOINT: ICD-10-CM

## 2023-11-10 DIAGNOSIS — R06.09 DYSPNEA ON EXERTION: Primary | ICD-10-CM

## 2023-11-10 DIAGNOSIS — R00.2 PALPITATIONS: ICD-10-CM

## 2023-11-10 PROCEDURE — 93010 ELECTROCARDIOGRAM REPORT: CPT | Performed by: STUDENT IN AN ORGANIZED HEALTH CARE EDUCATION/TRAINING PROGRAM

## 2023-11-10 PROCEDURE — 1036F TOBACCO NON-USER: CPT | Performed by: NURSE PRACTITIONER

## 2023-11-10 PROCEDURE — 99204 OFFICE O/P NEW MOD 45 MIN: CPT | Performed by: NURSE PRACTITIONER

## 2023-11-10 NOTE — PROGRESS NOTES
Primary Care Physician: Taryn Mosqueda, APRN-CNP  Primary Cardiologist:      Date of Visit: 11/10/2023 11:00 AM EST  Location of visit:  [facility]   Type of Visit: New Patient        Chief Complaint   Patient presents with    New Patient Visit     Referred by pcp, dizziness, tachycardia, palpitations       HPI / Summary:   Nai Hutton is a 23 y.o. 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    Surgical History:   No past surgical history on file.    Family History:   Family History   Problem Relation Name Age of Onset    Depression Mother Allyson        Social History:   Tobacco Use: Low Risk  (11/10/2023)    Patient History     Smoking Tobacco Use: Never     Smokeless Tobacco Use: Never     Passive Exposure: Never             MEDICATIONS:   Current Outpatient Medications   Medication Instructions    cyclobenzaprine (FLEXERIL) 5 mg, oral, Nightly PRN    drospirenone-ethinyl estradioL (Awilda, Gianvi) 3-0.02 mg tablet 1 tablet, oral, Daily         IMAGING REVIEWED:             LABS:  CBC:   Lab Results   Component Value Date    WBC 4.0 (L) 09/07/2023    RBC 4.32 09/07/2023    HGB 12.6 09/07/2023    HCT 38.8 09/07/2023    MCV 90 09/07/2023    MCHC 32.5 09/07/2023    RDW 12.2 09/07/2023     09/07/2023     CBC with Differential:    Lab Results   Component Value Date    WBC 4.0 (L) 09/07/2023    RBC 4.32 09/07/2023    HGB 12.6 09/07/2023    HCT 38.8 09/07/2023     09/07/2023    MCV 90 09/07/2023    MCHC 32.5 09/07/2023    RDW 12.2 09/07/2023    LYMPHOPCT 45.9 09/07/2023    MONOPCT 11.3 09/07/2023    EOSPCT 5.8 09/07/2023    BASOPCT 1.0 09/07/2023     "MONOSABS 0.45 09/07/2023    LYMPHSABS 1.83 09/07/2023    EOSABS 0.23 09/07/2023    BASOSABS 0.04 09/07/2023     CMP:    Lab Results   Component Value Date     09/07/2023    K 4.2 09/07/2023     09/07/2023    CO2 29 09/07/2023    BUN 13 09/07/2023    CREATININE 0.71 09/07/2023    GLUCOSE 75 09/07/2023    PROT 6.6 09/07/2023    CALCIUM 8.9 09/07/2023    BILITOT 0.3 09/07/2023    ALKPHOS 38 09/07/2023    AST 14 09/07/2023    ALT 10 09/07/2023     BMP:    Lab Results   Component Value Date     09/07/2023    K 4.2 09/07/2023     09/07/2023    CO2 29 09/07/2023    BUN 13 09/07/2023    CREATININE 0.71 09/07/2023    CALCIUM 8.9 09/07/2023    GLUCOSE 75 09/07/2023     Magnesium:No results found for: \"MG\"  Troponin:  No results found for: \"TROPHS\"  BNP: No results found for: \"BNP\"    Lipid Panel:  Lab Results   Component Value Date    HDL 59.9 09/07/2023    CHHDL 3.0 09/07/2023    VLDL 19 09/07/2023    TRIG 96 09/07/2023        Lab work and imaging results independently reviewed by me       ECG dated 11/10/2023 independently reviewed   NSR 63       Constitutional:       Appearance: Healthy appearance. Not in distress.   Eyes:      Conjunctiva/sclera: Conjunctivae normal.   Neck:      Vascular: JVD normal.   Pulmonary:      Effort: Pulmonary effort is normal.      Breath sounds: Normal breath sounds.   Cardiovascular:      PMI at left midclavicular line. Normal rate. Regular rhythm. Normal S1. Normal S2.       Murmurs: There is no murmur.      No rub.   Pulses:     Intact distal pulses.   Edema:     Peripheral edema absent.   Abdominal:      General: Bowel sounds are normal.   Musculoskeletal:      Cervical back: Neck supple. Skin:     General: Skin is warm and dry.   Neurological:      Mental Status: Alert and oriented to person, place and time.               Problem List Items Addressed This Visit             ICD-10-CM    Bipolar 2 disorder (CMS/[facility]) F31.81    Joint pain M25.50    Hypermobility of " joint M24.9    Relevant Orders    Transthoracic echo (TTE) complete    Palpitations R00.2    Relevant Orders    ECG 12 Lead    Transthoracic echo (TTE) complete    Holter or Event Cardiac Monitor     Other Visit Diagnoses         Codes    Dyspnea on exertion    -  Primary R06.09    Relevant Orders    Transthoracic echo (TTE) complete    Holter or Event Cardiac Monitor    Chest pain, unspecified type     R07.9    Relevant Orders    ECG 12 Lead    Transthoracic echo (TTE) complete    Holter or Event Cardiac Monitor          Extended Holter r/o arrhythmia vs POTS   Echocardiogram in the work up of Ehler-Danlos syndrome       MALORIE Olmedo         Followup Appts:  Future Appointments   Date Time Provider Department Center   11/24/2023  2:00 PM [facility] ECHO ROOM [place] [address] CON Rad Cent   11/24/2023  3:00 PM [facility] HOLTER CARDIAC CLINIC [place] [address] CON Rad Cent   12/19/2023  5:15 PM MALORIE Valadez [id number] [facility]   1/8/2024 10:40 AM MALORIE Olmedo [id number] [facility]

## 2023-11-13 ENCOUNTER — HOSPITAL ENCOUNTER (OUTPATIENT)
Dept: CARDIOLOGY | Facility: HOSPITAL | Age: 23
Discharge: HOME | End: 2023-11-13
Payer: COMMERCIAL

## 2023-11-13 DIAGNOSIS — R07.9 CHEST PAIN, UNSPECIFIED TYPE: ICD-10-CM

## 2023-11-13 DIAGNOSIS — M25.59 PAIN IN OTHER JOINT: ICD-10-CM

## 2023-11-13 DIAGNOSIS — R00.2 PALPITATIONS: ICD-10-CM

## 2023-11-13 PROBLEM — M24.9 HYPERMOBILITY OF JOINT: Status: ACTIVE | Noted: 2023-11-13

## 2023-11-13 PROCEDURE — 93005 ELECTROCARDIOGRAM TRACING: CPT

## 2023-11-13 RX ORDER — CYCLOBENZAPRINE HCL 5 MG
5 TABLET ORAL NIGHTLY PRN
Qty: 60 TABLET | Refills: 0 | Status: SHIPPED | OUTPATIENT
Start: 2023-11-13 | End: 2024-01-12

## 2023-11-14 LAB
ATRIAL RATE: 63 BPM
P AXIS: 60 DEGREES
P OFFSET: 208 MS
P ONSET: 155 MS
PR INTERVAL: 134 MS
Q ONSET: 222 MS
QRS COUNT: 10 BEATS
QRS DURATION: 76 MS
QT INTERVAL: 374 MS
QTC CALCULATION(BAZETT): 382 MS
QTC FREDERICIA: 380 MS
R AXIS: 70 DEGREES
T AXIS: 53 DEGREES
T OFFSET: 409 MS
VENTRICULAR RATE: 63 BPM

## 2023-11-20 DIAGNOSIS — G43.E09 CHRONIC MIGRAINE WITH AURA WITHOUT STATUS MIGRAINOSUS, NOT INTRACTABLE: Primary | ICD-10-CM

## 2023-11-20 RX ORDER — SUMATRIPTAN 50 MG/1
50 TABLET, FILM COATED ORAL ONCE AS NEEDED
Qty: 27 TABLET | Refills: 3 | Status: SHIPPED | OUTPATIENT
Start: 2023-11-20 | End: 2024-03-12 | Stop reason: WASHOUT

## 2023-11-24 ENCOUNTER — HOSPITAL ENCOUNTER (OUTPATIENT)
Dept: CARDIOLOGY | Facility: HOSPITAL | Age: 23
Discharge: HOME | End: 2023-11-24
Payer: COMMERCIAL

## 2023-11-24 VITALS — SYSTOLIC BLOOD PRESSURE: 114 MMHG | DIASTOLIC BLOOD PRESSURE: 70 MMHG

## 2023-11-24 DIAGNOSIS — R00.2 PALPITATIONS: ICD-10-CM

## 2023-11-24 DIAGNOSIS — R06.09 DYSPNEA ON EXERTION: ICD-10-CM

## 2023-11-24 DIAGNOSIS — R07.9 CHEST PAIN, UNSPECIFIED TYPE: ICD-10-CM

## 2023-11-24 DIAGNOSIS — M24.9 HYPERMOBILITY OF JOINT: ICD-10-CM

## 2023-11-24 LAB
AORTIC VALVE PEAK VELOCITY: 1.49
AV PEAK GRADIENT: 8.9
AVA (PEAK VEL): 2.55
EJECTION FRACTION APICAL 4 CHAMBER: 61.8
EJECTION FRACTION: 61
LEFT ATRIUM VOLUME AREA LENGTH INDEX BSA: 30.3
LEFT VENTRICLE INTERNAL DIMENSION DIASTOLE: 4.65 (ref 3.5–6)
LEFT VENTRICULAR OUTFLOW TRACT DIAMETER: 1.99
MITRAL VALVE E/A RATIO: 1.83
MITRAL VALVE E/E' RATIO: 6
RIGHT VENTRICLE FREE WALL PEAK S': 16
TRICUSPID ANNULAR PLANE SYSTOLIC EXCURSION: 2.2

## 2023-11-24 PROCEDURE — 93306 TTE W/DOPPLER COMPLETE: CPT | Performed by: INTERNAL MEDICINE

## 2023-11-24 PROCEDURE — 93246 EXT ECG>7D<15D RECORDING: CPT

## 2023-11-24 PROCEDURE — 93306 TTE W/DOPPLER COMPLETE: CPT

## 2023-12-12 ENCOUNTER — OFFICE VISIT (OUTPATIENT)
Dept: PRIMARY CARE | Facility: CLINIC | Age: 23
End: 2023-12-12
Payer: COMMERCIAL

## 2023-12-12 VITALS
HEIGHT: 62 IN | DIASTOLIC BLOOD PRESSURE: 68 MMHG | WEIGHT: 164.2 LBS | BODY MASS INDEX: 30.22 KG/M2 | HEART RATE: 76 BPM | SYSTOLIC BLOOD PRESSURE: 104 MMHG

## 2023-12-12 DIAGNOSIS — J01.10 ACUTE NON-RECURRENT FRONTAL SINUSITIS: Primary | ICD-10-CM

## 2023-12-12 DIAGNOSIS — B37.9 YEAST INFECTION: ICD-10-CM

## 2023-12-12 DIAGNOSIS — A60.00 GENITAL HERPES SIMPLEX, UNSPECIFIED SITE: ICD-10-CM

## 2023-12-12 DIAGNOSIS — F84.0 AUTISTIC BEHAVIOR (HHS-HCC): ICD-10-CM

## 2023-12-12 PROCEDURE — 1036F TOBACCO NON-USER: CPT | Performed by: REGISTERED NURSE

## 2023-12-12 PROCEDURE — 99214 OFFICE O/P EST MOD 30 MIN: CPT | Performed by: REGISTERED NURSE

## 2023-12-12 RX ORDER — VALACYCLOVIR HYDROCHLORIDE 500 MG/1
500 TABLET, FILM COATED ORAL 2 TIMES DAILY
Qty: 14 TABLET | Refills: 0 | Status: SHIPPED | OUTPATIENT
Start: 2023-12-12 | End: 2023-12-19

## 2023-12-12 RX ORDER — FLUCONAZOLE 150 MG/1
150 TABLET ORAL ONCE
Qty: 1 TABLET | Refills: 0 | Status: SHIPPED | OUTPATIENT
Start: 2023-12-12 | End: 2023-12-12

## 2023-12-12 RX ORDER — AMOXICILLIN AND CLAVULANATE POTASSIUM 875; 125 MG/1; MG/1
875 TABLET, FILM COATED ORAL 2 TIMES DAILY
Qty: 20 TABLET | Refills: 0 | Status: SHIPPED | OUTPATIENT
Start: 2023-12-12 | End: 2023-12-22

## 2023-12-12 ASSESSMENT — ENCOUNTER SYMPTOMS
FEVER: 0
COUGH: 0
CHILLS: 0
SORE THROAT: 0

## 2023-12-12 NOTE — PATIENT INSTRUCTIONS
Neuropsychology:  Linda Raymundo () [phone]  Jeff Young () [phone]  Sarbjit Cornejo () [phone]

## 2023-12-12 NOTE — PROGRESS NOTES
Subjective   Patient ID: Nai Hutton is a 23 y.o. female who presents for No chief complaint on file..    HPI     Headache for over a month. She states it started with the headaches and she had some sinus symptoms.   Having some ear pain, cardiology looked in her ears and possible fluid in left ear. She has tried covid tests, negative, no fevers.   She felt like her brain was in her neck.   Tried nasal spray, sudafed, sumatriptan  this does not help with the headaches. Took it 2-3 times.     Genital herpes: She was given valtrex in the past. She would like to have this routinely.     She wants to get assess for autism, she was previously diagnoised for bipolar and the medication did not help. So she has not been following up with them. She states she has not felt like people have taken her seriously about possible autism     All other systems reviewed and negative for complaint unless stated above.        Review of Systems   Constitutional:  Negative for chills and fever.   HENT:  Positive for ear pain and postnasal drip. Negative for sore throat.    Respiratory:  Negative for cough.        Objective   There were no vitals taken for this visit.    Physical Exam  Constitutional:       Appearance: Normal appearance.   HENT:      Right Ear: A middle ear effusion is present.      Left Ear: A middle ear effusion is present.      Nose: Nose normal.      Mouth/Throat:      Mouth: Mucous membranes are moist.   Cardiovascular:      Rate and Rhythm: Normal rate and regular rhythm.   Pulmonary:      Effort: Pulmonary effort is normal.      Breath sounds: Normal breath sounds.   Skin:     General: Skin is warm and dry.   Neurological:      Mental Status: She is alert and oriented to person, place, and time. Mental status is at baseline.   Psychiatric:         Mood and Affect: Mood normal.         Behavior: Behavior normal.           Assessment/Plan   Diagnoses and all orders for this visit:  Acute non-recurrent  frontal sinusitis  -     amoxicillin-pot clavulanate (Augmentin) 875-125 mg tablet; Take 1 tablet (875 mg) by mouth 2 times a day for 10 days.  - take allegra daily   Yeast infection  -     fluconazole (Diflucan) 150 mg tablet; Take 1 tablet (150 mg) by mouth 1 time for 1 dose.  Genital herpes simplex, unspecified site  -     valACYclovir (Valtrex) 500 mg tablet; Take 1 tablet (500 mg) by mouth 2 times a day for 7 days.  Autistic behavior  -     Referral to Adult Neuropsychology; Future

## 2023-12-19 ENCOUNTER — APPOINTMENT (OUTPATIENT)
Dept: PRIMARY CARE | Facility: CLINIC | Age: 23
End: 2023-12-19
Payer: COMMERCIAL

## 2023-12-27 DIAGNOSIS — B37.9 YEAST INFECTION: Primary | ICD-10-CM

## 2023-12-27 RX ORDER — FLUCONAZOLE 150 MG/1
150 TABLET ORAL ONCE
Qty: 1 TABLET | Refills: 0 | Status: SHIPPED | OUTPATIENT
Start: 2023-12-27 | End: 2023-12-27

## 2024-01-08 ENCOUNTER — APPOINTMENT (OUTPATIENT)
Dept: CARDIOLOGY | Facility: CLINIC | Age: 24
End: 2024-01-08
Payer: MEDICARE

## 2024-02-16 ENCOUNTER — OFFICE VISIT (OUTPATIENT)
Dept: OBSTETRICS AND GYNECOLOGY | Facility: CLINIC | Age: 24
End: 2024-02-16
Payer: MEDICARE

## 2024-02-16 VITALS
SYSTOLIC BLOOD PRESSURE: 113 MMHG | DIASTOLIC BLOOD PRESSURE: 69 MMHG | HEIGHT: 62 IN | WEIGHT: 163 LBS | BODY MASS INDEX: 30 KG/M2

## 2024-02-16 DIAGNOSIS — Z12.4 CERVICAL CANCER SCREENING: ICD-10-CM

## 2024-02-16 DIAGNOSIS — Z01.419 WELL WOMAN EXAM WITH ROUTINE GYNECOLOGICAL EXAM: ICD-10-CM

## 2024-02-16 DIAGNOSIS — R10.2 PELVIC PAIN: ICD-10-CM

## 2024-02-16 DIAGNOSIS — G43.109 MIGRAINE WITH AURA AND WITHOUT STATUS MIGRAINOSUS, NOT INTRACTABLE: Primary | ICD-10-CM

## 2024-02-16 DIAGNOSIS — Z11.3 SCREEN FOR STD (SEXUALLY TRANSMITTED DISEASE): ICD-10-CM

## 2024-02-16 PROCEDURE — 1036F TOBACCO NON-USER: CPT | Performed by: OBSTETRICS & GYNECOLOGY

## 2024-02-16 PROCEDURE — 99213 OFFICE O/P EST LOW 20 MIN: CPT | Performed by: OBSTETRICS & GYNECOLOGY

## 2024-02-16 PROCEDURE — 87491 CHLMYD TRACH DNA AMP PROBE: CPT

## 2024-02-16 PROCEDURE — 87591 N.GONORRHOEAE DNA AMP PROB: CPT

## 2024-02-16 PROCEDURE — 88175 CYTOPATH C/V AUTO FLUID REDO: CPT

## 2024-02-16 PROCEDURE — 87800 DETECT AGNT MULT DNA DIREC: CPT

## 2024-02-16 PROCEDURE — 99385 PREV VISIT NEW AGE 18-39: CPT | Performed by: OBSTETRICS & GYNECOLOGY

## 2024-02-16 RX ORDER — VILAZODONE HYDROCHLORIDE 10 MG/1
10 TABLET ORAL DAILY
COMMUNITY
End: 2024-03-12 | Stop reason: ALTCHOICE

## 2024-02-16 NOTE — PROGRESS NOTES
"  [facility]  Nai Hutton is a 23 y.o. G0 presents for  and pelvic pain, concern for PMDD and hormonal and abnormal bleeding concerns.     Pelvic Pain:  Notes she gets pelvic pain during menses and outside menses, feels pain more on the right than the left.  Keeps a symptom diary for pelvic pain as well as menses and mood symptoms.  Admits to dyspareunia, dyschezia, dysmenorrhea  Mentions she was evaluated for endometriosis in the past by physical exam and laparoscopy.  Laparoscopy was done at the time of tubal ligation and was found to be negative for endometriosis.  Tried pelvic floor therapy with no significant response.  Thinks this exacerbated her pain.  Her pelvic floor therapist informed her she had some hypertonicity on her right pelvic region.  Has seen a GI specialist for pelvic pain workup which yielded no result per patient report.  The only thing that has given her any inclination as to a cause of her pelvic pain is that she has suspects she has Hypermobile Spectrum Disorder versus Yolie-Danlos but no formal diagnosis to date. Reports formal diagnosis is pending cardiology workup    Pt reports h/o PMDD:  She feels \"her body breaks down mentally\" one week before her menses begins  Is uncertain if her menstrual cycles are regular because she has been on birth control long-term  Mentions she gets chest pain a week prior to her menses in addition to the other premenstrual symptoms she experiences.  Notes she was evaluated by a cardiologist with echocardiogram, EKG and Holter monitoring.  Has a past history of pelvic US done which was unremarkable  Has tried a lot of birth pills since teenage years  On and off birth control, Awilda, last taken on 01/23  On sumatriptan, for migraines. Started Vilazodone, SSRI in 01/2023  Between ages of 12 -13, she had migraines with visual aura occurring once a month before menarche. She notes it was explained to her as both a menstrual migraine and migraine with aura. " "Described the visual aura as a progressively increasing scotoma.    Chest pain/anxiety:  Endorses transient chest discomfort that she thinks are associated with menses.  Has been told in the past that anxiety is exacerbated by hormones and worsens prior to menses.  Her chest pain, therefore, has been attributed to anxiety in the past.  On inquiring if patient thinks this is anxiety related, she also states that it is.  This has been discussed and evaluated by previous physician.  Currently follows with psychiatry.  On Viibryd for treatment.    Annual Gyn Exam:  S/p Tubal ligation 4/2022  No immediate concern for STIs.  Amenable to GC/CT  Known h/o genital HSV.  Rare outbreaks.  Takes valacyclovir as needed but rarely needs this  - last pap, pap hx: 2020 neg per pt. No h/o of abnormal per pt. collected  - last mammogram, breast hx: n/a  - breast abnormalities: negative for lumps, skin changes, nipple discharge, pain  - last colonoscopy: n/a  - last DEXA scan: n/a    OB hx: G0  GYN hx:   - menarche, menstrual pattern, LMP: unsure of regularity  - Sexual activity/issues, STI protection/history, birth control: History of HSV, BTL in 2022  - HPV vaccine: complete  FH:  No GYN related cancers including breast, ovarian, endometrial, or colon cancer.     ROS notable for fatigue, dry eyes, chest pain,urinary frequency and anxiety  10 point ROS negative except as listed above.     Physical exam  /69   Ht 1.575 m (5' 2\")   Wt 73.9 kg (163 lb)   LMP 01/25/2024 (Exact Date)   BMI 29.81 kg/m²      Physical Exam  Constitutional:       Appearance: Normal appearance.   HENT:      Head: Normocephalic and atraumatic.   Eyes:      Extraocular Movements: Extraocular movements intact.      Conjunctiva/sclera: Conjunctivae normal.      Pupils: Pupils are equal, round, and reactive to light.   Pulmonary:      Effort: Pulmonary effort is normal.   Chest:   Breasts:     Right: Normal.      Left: Normal.   Abdominal:      General: " Abdomen is flat.      Palpations: Abdomen is soft.   Genitourinary:     General: Normal vulva.      Vagina: Normal.      Cervix: Normal.      Uterus: Normal.       Adnexa: Right adnexa normal and left adnexa normal.   Skin:     General: Skin is warm and dry.   Neurological:      General: No focal deficit present.      Mental Status: She is alert.   Psychiatric:         Mood and Affect: Mood normal.         Behavior: Behavior normal.         Thought Content: Thought content normal.         Judgment: Judgment normal.         Assessment/Plan    Pelvic Pain:  Patient seems frustrated given longstanding history of pelvic pain.  Uncertain etiology.  She has had extensive workup for pelvic plain including imaging, laparoscopy, medical management with COCs, pelvic floor physical therapy, GI workup, and patient report of possible hypermobility versus Yolie-Danlos syndrome.  Pelvic exam normal today.  Discussed that we can continue to offer medical management and see if we can find her some relief or we can repeat a workup for pelvic pain and I am happy to investigate further with her.  Pelvic US ordered  Consider a repeat GI evaluation  No indication for repeat laparoscopy at this time as laparoscopy in 2022 was negative.  I suspect this would be low yield but may consider this in the future if workup meets this there  Patient will let me know if she desires further investigation into her pelvic pain    Discussed that she is not a candidate for SARAY's given her history of migraines with aura as a diagnosis on her medical chart.  She has been on COCs on and off long-term.  Discussed elevated risk of stroke in the setting of migraines with aura.  Discussed potential for alternative diagnosis that would include menstrual migraines.  Referral to a neurologist to evaluate to type of migraine she has.  She would need to have a note on the chart stating that she is a candidate for estrogen-containing therapy before I can prescribe  this for her  Discussed progesterone only options including Slynd, norethindrone, Depo, Nexplanon and LNG-IUD.  She may also be a candidate for other nonbirth control dosed progesterone options.  Explained to the patient the high risk and contraindication to COCs with her due to her hx of migraine with aura.    H/o PMDD:  Discussed options for treatment including birth control or with SSRI  Not a candidate for COCs because of past hx of migraines with auras.   Discussed progesterone only options and patient will consider but declines at this time.  Currently follows with psychiatry and is on Viibryd.  Encouraged to discuss PMDD concerns with psychiatry given that she is declining hormonal management at this time    Annual Gyn Exam:  - GC/CT collected  - HPV vaccine: complete  - s/p BTL  - last pap, pap hx: 2020 neg per pt. No h/o of abnormal per pt. collected  - last mammogram, breast hx: n/a  - breast exam is normal. Discussed self breast awareness.  - last colonoscopy: n/a  - last DEXA scan: n/a      Scribe Attestation  By signing my name below, Shahana HUSTON, Scribe   attest that this documentation has been prepared under the direction and in the presence of Master Hatfield MD.

## 2024-02-20 LAB
C TRACH RRNA SPEC QL NAA+PROBE: NEGATIVE
N GONORRHOEA DNA SPEC QL PROBE+SIG AMP: NEGATIVE

## 2024-02-21 DIAGNOSIS — Z30.41 ENCOUNTER FOR SURVEILLANCE OF CONTRACEPTIVE PILLS: ICD-10-CM

## 2024-02-21 DIAGNOSIS — G44.211 INTRACTABLE EPISODIC TENSION-TYPE HEADACHE: ICD-10-CM

## 2024-02-21 DIAGNOSIS — G44.211 INTRACTABLE EPISODIC TENSION-TYPE HEADACHE: Primary | ICD-10-CM

## 2024-02-21 RX ORDER — DROSPIRENONE AND ETHINYL ESTRADIOL 0.02-3(28)
1 KIT ORAL DAILY
Qty: 28 TABLET | Refills: 0 | Status: SHIPPED | OUTPATIENT
Start: 2024-02-21 | End: 2024-03-12 | Stop reason: SDUPTHER

## 2024-02-21 RX ORDER — DROSPIRENONE AND ETHINYL ESTRADIOL 0.02-3(28)
1 KIT ORAL DAILY
Qty: 28 TABLET | Refills: 0 | Status: SHIPPED | OUTPATIENT
Start: 2024-02-21 | End: 2024-02-21 | Stop reason: SDUPTHER

## 2024-03-01 ENCOUNTER — OFFICE VISIT (OUTPATIENT)
Dept: CARDIOLOGY | Facility: CLINIC | Age: 24
End: 2024-03-01
Payer: MEDICARE

## 2024-03-01 VITALS
WEIGHT: 156 LBS | HEIGHT: 62 IN | HEART RATE: 78 BPM | SYSTOLIC BLOOD PRESSURE: 112 MMHG | OXYGEN SATURATION: 96 % | DIASTOLIC BLOOD PRESSURE: 73 MMHG | BODY MASS INDEX: 28.71 KG/M2

## 2024-03-01 DIAGNOSIS — R00.2 PALPITATIONS: Primary | ICD-10-CM

## 2024-03-01 LAB
CYTOLOGY CMNT CVX/VAG CYTO-IMP: NORMAL
LAB AP HPV GENOTYPE QUESTION: YES
LAB AP HPV HR: NORMAL
LAB AP PAP ADDITIONAL TESTS: NORMAL
LABORATORY COMMENT REPORT: NORMAL
LMP START DATE: NORMAL
PATH REPORT.TOTAL CANCER: NORMAL

## 2024-03-01 PROCEDURE — 1036F TOBACCO NON-USER: CPT | Performed by: NURSE PRACTITIONER

## 2024-03-01 PROCEDURE — 99213 OFFICE O/P EST LOW 20 MIN: CPT | Performed by: NURSE PRACTITIONER

## 2024-03-01 NOTE — PROGRESS NOTES
Primary Care Physician: Taryn Mosqueda, APRN-CNP  Primary Cardiologist:      Date of Visit: 03/01/2024 11:00 AM EST  Location of visit:  [facility]   Type of Visit: Follow Up         Chief Complaint   Patient presents with    Follow-up     Here to go over her monitor results        HPI / Summary:   Nai Hutton is a 23 y.o. female who  returns for follow up        Still with occasional palpitations, dizziness consistent with vertigo   Extended Holter symptoms correlated with NSR ;  negative for arrhythmia.  Echo showed preserved LV systolic function without evidence of valve disease or aortic dilation         12 system review is negative except as noted above       Medical History:   History reviewed. No pertinent past medical history.    Surgical History:   Past Surgical History:   Procedure Laterality Date    HERNIA REPAIR      TUBAL LIGATION         Family History:   Family History   Problem Relation Name Age of Onset    Depression Mother Allyson     Bipolar disorder Maternal Grandmother         Social History:   Tobacco Use: Low Risk  (3/1/2024)    Patient History     Smoking Tobacco Use: Never     Smokeless Tobacco Use: Never     Passive Exposure: Never             MEDICATIONS:   Current Outpatient Medications   Medication Instructions    drospirenone-ethinyl estradioL (Awilda, Gianvi) 3-0.02 mg tablet 1 tablet, oral, Daily    fluticasone (Flonase) 50 mcg/actuation nasal spray 1 spray, Each Nostril, Daily, Shake gently. Before first use, prime pump. After use, clean tip and replace cap.    rimegepant (NURTEC) 75 mg, oral, Every other day    SUMAtriptan (IMITREX) 50 mg, oral, Once as needed, May repeat after 2 hours.    vilazodone (VIIBRYD) 10 mg, oral, Daily         IMAGING REVIEWED:             LABS:  CBC:   Lab Results   Component Value Date    WBC 4.0 (L) 09/07/2023    RBC 4.32 09/07/2023    HGB 12.6 09/07/2023    HCT 38.8 09/07/2023    MCV 90 09/07/2023    MCHC 32.5 09/07/2023    RDW 12.2  "09/07/2023     09/07/2023     CBC with Differential:    Lab Results   Component Value Date    WBC 4.0 (L) 09/07/2023    RBC 4.32 09/07/2023    HGB 12.6 09/07/2023    HCT 38.8 09/07/2023     09/07/2023    MCV 90 09/07/2023    MCHC 32.5 09/07/2023    RDW 12.2 09/07/2023    LYMPHOPCT 45.9 09/07/2023    MONOPCT 11.3 09/07/2023    EOSPCT 5.8 09/07/2023    BASOPCT 1.0 09/07/2023    MONOSABS 0.45 09/07/2023    LYMPHSABS 1.83 09/07/2023    EOSABS 0.23 09/07/2023    BASOSABS 0.04 09/07/2023     CMP:    Lab Results   Component Value Date     09/07/2023    K 4.2 09/07/2023     09/07/2023    CO2 29 09/07/2023    BUN 13 09/07/2023    CREATININE 0.71 09/07/2023    GLUCOSE 75 09/07/2023    PROT 6.6 09/07/2023    CALCIUM 8.9 09/07/2023    BILITOT 0.3 09/07/2023    ALKPHOS 38 09/07/2023    AST 14 09/07/2023    ALT 10 09/07/2023     BMP:    Lab Results   Component Value Date     09/07/2023    K 4.2 09/07/2023     09/07/2023    CO2 29 09/07/2023    BUN 13 09/07/2023    CREATININE 0.71 09/07/2023    CALCIUM 8.9 09/07/2023    GLUCOSE 75 09/07/2023     Magnesium:No results found for: \"MG\"  Troponin:  No results found for: \"TROPHS\"  BNP: No results found for: \"BNP\"    Lipid Panel:  Lab Results   Component Value Date    HDL 59.9 09/07/2023    CHHDL 3.0 09/07/2023    VLDL 19 09/07/2023    TRIG 96 09/07/2023        Lab work and imaging results independently reviewed by me       ECG dated 11/10/2023 independently reviewed   NSR 63       Constitutional:       Appearance: Healthy appearance. Not in distress.   Eyes:      Conjunctiva/sclera: Conjunctivae normal.   Neck:      Vascular: JVD normal.   Pulmonary:      Effort: Pulmonary effort is normal.      Breath sounds: Normal breath sounds.   Cardiovascular:      PMI at left midclavicular line. Normal rate. Regular rhythm. Normal S1. Normal S2.       Murmurs: There is no murmur.      No rub.   Pulses:     Intact distal pulses.   Edema:     Peripheral edema " absent.   Abdominal:      General: Bowel sounds are normal.   Musculoskeletal:      Cervical back: Neck supple. Skin:     General: Skin is warm and dry.   Neurological:      Mental Status: Alert and oriented to person, place and time.               Problem List Items Addressed This Visit             ICD-10-CM    Palpitations - Primary R00.2     Sodium / fluid balance   Mediterranean diet    Call cardiology as needed     MALORIE Olmedo         Followup Appts:  Future Appointments   Date Time Provider Department Center   3/12/2024  5:45 PM MALORIE Valadez [id number]

## 2024-03-07 NOTE — PROGRESS NOTES
Subjective   Patient ID: Nai Hutton is a 23 y.o. 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 She had a paper sent through [employer] for it.//Would also like referral for pelvic floor therapy ).    HPI   Went to cardiology, everything looks okay, was told she might have Mild POTS, having some heart flutters, no syncope.     PMDD: She gets really bad right before her periods, her moods are very bad and she has had. Seeing Dr. Hatfield, concerns for BC for a long time, concerns for stroke symptoms. Was going to try increasing dose of her Vilazodone 40mg.   Last month she was off BC and she had issues. She had been hospitalized and put on a 72 hour hold before. She will start to have symptoms up to 10 days prior to her periods, she will be suicidal at times.   She had migraine with aura in the past, OBGYN is concerned for stroke risk with DEVYN, She has been on BC since 13. Has appointment with neurology.     Pelvic pain: She had done Pelvic floor PT before for pelvic pain. Has been checked for endometriosis, getting random cramping and pains at times. She did pelvic floor in PT. She was getting more pains at times.      Hypermobility syndrome: She did see . They were unclear of ELD. She needs to get echo done. She had this scheduled but it was at [facility]. They did not want to finalize dx. She has fluctuations in her heart rate. She is getting readings of 160 at times with minimal exertion. She does not feel like she is going to pass out.      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 She feels sob and she has felt like she is going to pass out with standing up. She was told to be evaluated with cardiology and possible  Echo r/t hypermobility syndrome.      Multiple joint pains: She went and saw rheumatology before. Dr. Jimenez in [facility]. She gets more flair of pain in her body with exercise. Family members have fibromyalgia. She was given flexeril and found that it would interact with her medication and could cause seizure.   She has been doing PT. Doing stretches and this has not been helping too much. She was told that she has jaw issues.   Hypermobility, having issues pain with flexion.   Feels like her head is very heavy all the time. Holding her head up all the time is hard. Has seen a chiropractor in the past, gets minimal relief.      Ibuprofen 800mg and this is not helping much, would be open to trying flexeril      Bouts of constipation and diarrhea.  Encouraged water intake. Encouraged fiber. She will be constipated for 5 days and then she will have diarrhea. Some mucus in her stool. Slimy. She had grainy stool today. Denies blood, today it was dark. Has always been irregular. She has always gone about 4 days. Worse over the past 4 months. She has tried fiber supplements in the past. She does not drink a lot of water. She has tried to change her diet. She tried factor meals. She a slight accident recently. She has done PT for her pelvic floor.   States that she has not tried a stool softener, she gets diarrhea.   She saw GI before. She thought she had celiac and was checked for this   She took prevacid in the past      Hx of Malignant Hyperthermia      All other systems reviewed and negative for complaint unless stated above.     Surgery: Hernia repair   Family: MH, Mother   smoker: none     Etoh: occasional   Drug use: none       Review of Systems   Constitutional:  Negative for chills and fever.   HENT:  Negative for congestion, ear pain and rhinorrhea.    Eyes:  Negative for discharge and redness.   Respiratory:  Negative for cough, shortness of breath and wheezing.    Cardiovascular:  Negative for chest pain and leg  "swelling.   Gastrointestinal:  Negative for abdominal pain, constipation, diarrhea, nausea and vomiting.   Genitourinary:  Negative for difficulty urinating, frequency and urgency.   Musculoskeletal:  Negative for gait problem.   Skin:  Negative for rash and wound.   Neurological:  Negative for dizziness, weakness and headaches.   Psychiatric/Behavioral:  Negative for confusion. The patient is not nervous/anxious.        Objective   /84 (BP Location: Right arm)   Pulse 85   Ht 1.575 m (5' 2\")   Wt 71.9 kg (158 lb 9.6 oz)   LMP 01/25/2024 (Exact Date)   BMI 29.01 kg/m²     Physical Exam  Vitals reviewed.   Constitutional:       Appearance: Normal appearance.   HENT:      Head: Normocephalic.      Right Ear: Tympanic membrane, ear canal and external ear normal.      Left Ear: Tympanic membrane, ear canal and external ear normal.      Nose: No rhinorrhea.      Mouth/Throat:      Mouth: Mucous membranes are moist.      Pharynx: Oropharynx is clear.   Eyes:      Pupils: Pupils are equal, round, and reactive to light.   Cardiovascular:      Rate and Rhythm: Normal rate and regular rhythm.      Pulses: Normal pulses.   Pulmonary:      Effort: Pulmonary effort is normal.      Breath sounds: Normal breath sounds.   Abdominal:      General: Abdomen is flat. Bowel sounds are normal.      Palpations: Abdomen is soft.   Musculoskeletal:         General: No tenderness. Normal range of motion.      Right lower leg: No edema.      Left lower leg: No edema.   Lymphadenopathy:      Cervical: No cervical adenopathy.   Skin:     General: Skin is warm and dry.      Findings: No rash.   Neurological:      Mental Status: She is alert and oriented to person, place, and time.   Psychiatric:         Mood and Affect: Mood normal.         Behavior: Behavior normal.         Assessment/Plan         #Hypermobility syndrome   Getting echo for heart issues   Can continue flexeril   Cleared from Cardiology     #PMDD  Taking DEVYN  Has tried " coming off of bc and having severe suicidal ideations   Would recommend staying on it until eval with neuro   OBGYN was concerned for Stroke  Trying to work with OBGYN for bc to manage moods     #Pelvic floor pain   PT referral placed      #GI issues   Discussed diet  Encouraged water  Encouraged fiber  FODMAP diet provided   May need to see GI in the future     #Concerns for autism   Getting autism assessment in April   She finally found a clinic that will take her insurance

## 2024-03-12 ENCOUNTER — OFFICE VISIT (OUTPATIENT)
Dept: PRIMARY CARE | Facility: CLINIC | Age: 24
End: 2024-03-12
Payer: MEDICARE

## 2024-03-12 VITALS
BODY MASS INDEX: 29.19 KG/M2 | WEIGHT: 158.6 LBS | HEIGHT: 62 IN | SYSTOLIC BLOOD PRESSURE: 130 MMHG | DIASTOLIC BLOOD PRESSURE: 84 MMHG | HEART RATE: 85 BPM

## 2024-03-12 DIAGNOSIS — Z30.41 ENCOUNTER FOR SURVEILLANCE OF CONTRACEPTIVE PILLS: ICD-10-CM

## 2024-03-12 DIAGNOSIS — M24.80 GENERALIZED HYPERMOBILITY OF JOINTS: Primary | ICD-10-CM

## 2024-03-12 DIAGNOSIS — F32.81 PMDD (PREMENSTRUAL DYSPHORIC DISORDER): ICD-10-CM

## 2024-03-12 DIAGNOSIS — R10.2 PELVIC PAIN: ICD-10-CM

## 2024-03-12 PROCEDURE — 99214 OFFICE O/P EST MOD 30 MIN: CPT | Performed by: REGISTERED NURSE

## 2024-03-12 PROCEDURE — 1036F TOBACCO NON-USER: CPT | Performed by: REGISTERED NURSE

## 2024-03-12 RX ORDER — VILAZODONE HYDROCHLORIDE 40 MG/1
40 TABLET ORAL DAILY
COMMUNITY
Start: 2024-02-28

## 2024-03-12 RX ORDER — DROSPIRENONE AND ETHINYL ESTRADIOL 0.02-3(28)
1 KIT ORAL DAILY
Qty: 28 TABLET | Refills: 0 | Status: SHIPPED | OUTPATIENT
Start: 2024-03-12 | End: 2024-06-10 | Stop reason: SDUPTHER

## 2024-03-12 ASSESSMENT — ENCOUNTER SYMPTOMS
DIZZINESS: 0
FEVER: 0
DIARRHEA: 0
CHILLS: 0
FREQUENCY: 0
NAUSEA: 0
EYE DISCHARGE: 0
SHORTNESS OF BREATH: 0
WOUND: 0
DIFFICULTY URINATING: 0
VOMITING: 0
WEAKNESS: 0
HEADACHES: 0
EYE REDNESS: 0
RHINORRHEA: 0
WHEEZING: 0
NERVOUS/ANXIOUS: 0
CONFUSION: 0
CONSTIPATION: 0
ABDOMINAL PAIN: 0
COUGH: 0

## 2024-03-12 NOTE — PATIENT INSTRUCTIONS
PT/OT:  Gerry () [phone]  Zena () [phone]  [facility]  [phone]  Anjel [phone]  Ian [phone]

## 2024-04-10 DIAGNOSIS — R30.0 DYSURIA: ICD-10-CM

## 2024-04-10 PROCEDURE — 87086 URINE CULTURE/COLONY COUNT: CPT

## 2024-04-10 PROCEDURE — 81003 URINALYSIS AUTO W/O SCOPE: CPT

## 2024-04-18 ENCOUNTER — OFFICE VISIT (OUTPATIENT)
Dept: PRIMARY CARE | Facility: CLINIC | Age: 24
End: 2024-04-18
Payer: MEDICARE

## 2024-04-18 VITALS
DIASTOLIC BLOOD PRESSURE: 83 MMHG | SYSTOLIC BLOOD PRESSURE: 128 MMHG | WEIGHT: 159 LBS | BODY MASS INDEX: 29.26 KG/M2 | HEART RATE: 86 BPM | HEIGHT: 62 IN

## 2024-04-18 DIAGNOSIS — B00.9 RECURRENT HSV (HERPES SIMPLEX VIRUS): Primary | ICD-10-CM

## 2024-04-18 DIAGNOSIS — B00.9 RECURRENT HSV (HERPES SIMPLEX VIRUS): ICD-10-CM

## 2024-04-18 PROCEDURE — 99214 OFFICE O/P EST MOD 30 MIN: CPT | Performed by: FAMILY MEDICINE

## 2024-04-18 PROCEDURE — 1036F TOBACCO NON-USER: CPT | Performed by: FAMILY MEDICINE

## 2024-04-18 RX ORDER — BUPROPION HYDROCHLORIDE 150 MG/1
150 TABLET ORAL DAILY
COMMUNITY
Start: 2024-03-22

## 2024-04-18 RX ORDER — VALACYCLOVIR HYDROCHLORIDE 500 MG/1
500 TABLET, FILM COATED ORAL 2 TIMES DAILY
Qty: 14 TABLET | Refills: 1 | Status: SHIPPED | OUTPATIENT
Start: 2024-04-18 | End: 2024-04-18 | Stop reason: SDUPTHER

## 2024-04-18 RX ORDER — VALACYCLOVIR HYDROCHLORIDE 500 MG/1
500 TABLET, FILM COATED ORAL 2 TIMES DAILY
Qty: 14 TABLET | Refills: 1 | Status: SHIPPED | OUTPATIENT
Start: 2024-04-18 | End: 2024-04-25

## 2024-04-18 ASSESSMENT — ENCOUNTER SYMPTOMS
SORE THROAT: 0
ANOREXIA: 0
NAUSEA: 0
HEADACHES: 0
FLANK PAIN: 0
BACK PAIN: 0
FREQUENCY: 0
FEVER: 0
CHILLS: 0
DIARRHEA: 0
ABDOMINAL PAIN: 0
DYSURIA: 0
HEMATURIA: 0
VOMITING: 0
CONSTIPATION: 0

## 2024-04-18 NOTE — PROGRESS NOTES
"Nai Hutton is a 23 y.o. female who presents for Sick Visit (Has been diagnosed with herpes in past, believes she's having and outbreak, but it looks different than last time; she's also been having pelvic pain; about to start period and she's worried about irritation from sanitary products during outbreak)    HSV: She has been swabbed and tested in the past. She is mostly getting it in the anal area. She is questioning about hemorrhoids because she wasn't having lesions but they did arise today. She typically has pelvic pain around her periods but wants to get checked because she iscurrently having pain for the last 2 or 3 weeks which is a bit longer than is typical for her. Generally wanting to get checked. Now sexually active. First outbreak was in 2021. She gets one per year now.     She is on COCP currently despite migraine with aura because they weighed risk vs benefits and determined that PMDD was severe enough to warrant it.     Objective   /83 (BP Location: Left arm, Patient Position: Sitting, BP Cuff Size: Adult)   Pulse 86   Ht 1.575 m (5' 2\")   Wt 72.1 kg (159 lb)   BMI 29.08 kg/m²     Gen: No acute distress, alert and oriented x3, pleasant   HEENT: moist mucous membranes, b/l external auditory canals are clear of debris, TMs within normal limits, no oropharyngeal lesions, eomi, perrla   Neck: thyroid within normal limits, no lymphadenopathy   CV: RRR, normal S1/S2, no murmur   Resp: Clear to auscultation bilaterally, no wheezes or rhonchi appreciated  Abd: soft, nontender, non-distended, no guarding/rigidity, bowel sounds present  Extr: no edema, no calf tenderness  Derm: Skin is warm and dry, no rashes appreciated  Psych: mood is good, affect is congruent, good hygiene, normal speech and eye contact  Neuro: cranial nerves grossly intact, normal gait  : Small white vesicles noted on posterior aspect of the vestibule    Assessment/Plan     #Recurrent HSV  Rx sent valtrex with 1 " refill    Answers submitted by the patient for this visit:  Female Genital Questionnaire (Submitted on 4/18/2024)  Chief Complaint: Female genitourinary complaint  genital itching: Yes  genital lesions: Yes  genital odor: No  genital rash: No  missed menses: No  pelvic pain: Yes  vaginal bleeding: No  vaginal discharge: Yes  Chronicity: recurrent  Onset: in the past 7 days  Frequency: intermittently  Progression since onset: waxing and waning  Severity of pain: no pain  Affected side: right  Pregnant now?: No  abdominal pain: No  anorexia: No  back pain: No  chills: No  constipation: No  diarrhea: No  discolored urine: No  dysuria: No  fever: No  flank pain: No  frequency: No  headaches: No  hematuria: No  joint pain: No  joint swelling: No  nausea: No  painful intercourse: No  rash: No  sore throat: No  urgency: No  vomiting: No  Aggravated by: bowel movements, intercourse  Sexual activity: sexually active  Partner with STD symptoms: possible  Birth control: tubal ligation  Menstrual history: irregular  Discharge characteristics: normal  Passing clots?: No  Passing tissue?: No

## 2024-06-10 DIAGNOSIS — Z30.41 ENCOUNTER FOR SURVEILLANCE OF CONTRACEPTIVE PILLS: ICD-10-CM

## 2024-06-10 RX ORDER — DROSPIRENONE AND ETHINYL ESTRADIOL 0.02-3(28)
1 KIT ORAL DAILY
Qty: 84 TABLET | Refills: 0 | Status: SHIPPED | OUTPATIENT
Start: 2024-06-10 | End: 2024-09-02

## 2024-06-25 DIAGNOSIS — B00.9 RECURRENT HSV (HERPES SIMPLEX VIRUS): Primary | ICD-10-CM

## 2024-06-25 RX ORDER — VALACYCLOVIR HYDROCHLORIDE 500 MG/1
500 TABLET, FILM COATED ORAL 2 TIMES DAILY
Qty: 6 TABLET | Refills: 0 | Status: SHIPPED | OUTPATIENT
Start: 2024-06-25 | End: 2024-06-28

## 2024-07-01 DIAGNOSIS — N92.1 MENORRHAGIA WITH IRREGULAR CYCLE: Primary | ICD-10-CM

## 2024-07-02 RX ORDER — NORETHINDRONE 0.35 MG/1
1 TABLET ORAL DAILY
Qty: 28 TABLET | Refills: 12 | Status: SHIPPED | OUTPATIENT
Start: 2024-07-02 | End: 2025-07-02

## 2024-07-09 ENCOUNTER — APPOINTMENT (OUTPATIENT)
Dept: NEUROLOGY | Facility: CLINIC | Age: 24
End: 2024-07-09
Payer: MEDICARE

## 2024-07-09 VITALS
HEART RATE: 78 BPM | HEIGHT: 62 IN | BODY MASS INDEX: 27.97 KG/M2 | SYSTOLIC BLOOD PRESSURE: 126 MMHG | WEIGHT: 152 LBS | DIASTOLIC BLOOD PRESSURE: 60 MMHG

## 2024-07-09 DIAGNOSIS — R51.9 HEADACHE DISORDER: ICD-10-CM

## 2024-07-09 PROCEDURE — 1036F TOBACCO NON-USER: CPT | Performed by: PSYCHIATRY & NEUROLOGY

## 2024-07-09 PROCEDURE — 99205 OFFICE O/P NEW HI 60 MIN: CPT | Performed by: PSYCHIATRY & NEUROLOGY

## 2024-07-09 RX ORDER — GABAPENTIN 100 MG/1
200 CAPSULE ORAL 3 TIMES DAILY
Qty: 270 CAPSULE | Refills: 0 | Status: SHIPPED | OUTPATIENT
Start: 2024-07-09 | End: 2024-10-07

## 2024-07-09 ASSESSMENT — ENCOUNTER SYMPTOMS
HEADACHES: 1
NUMBNESS: 1

## 2024-07-09 ASSESSMENT — VISUAL ACUITY: VA_NORMAL: 1

## 2024-07-09 NOTE — PATIENT INSTRUCTIONS
Thank you for seeing me today in the office.  Your symptoms are atypical for migraine. These could be myalgia vs fibromyalgia vs oxipital headache   Flexeril did interfere  with Wellbutrin so she stopped muscle relaxant.   So an other option would be gabapentin 100 mg (  take 2 caps) three times per day. If this is not helping will order MRI brain in the future, and might use Topamax.   Let me know if you have any questions or any side effect   Follow up in 3-6 months

## 2024-07-09 NOTE — PROGRESS NOTES
Subjective     Nai Hutton is a 23 y.o. year old female    Headache   Associated symptoms include numbness.   Neuropathy      Patient describes tingling in the left hand.  Patient is here to establish care with a neurologist. She was referred by her PCP/OB-GYN doctors.  She had headache with visual aura at age 12 years old diagnosed by neuro doctor. Every months and lasting for 1-2 days. She did not have headache since she started taking birth control pills. She tried to stop birth control pills but had suicidal thoughts and had pelvic cramps. She started birth control pills. Nurtec did not take due to insurance issue. Sumatriptan did not work for her in the past. No sure if she had topamax  in the past.   No more headache with aura but is different. No light sensitivity, no nausea. Duration is for hours and frequency 2/weeks. Pain is 7/10 in severity and is located in the back of the head and is like burning. In the afternoon. She started having this headache in the last year but got worse recently. Sneezing makes the headache worse.  She mentions lightheadedness with headache.   Numbness on the left, in the pink finger.   Hx of fibromyalgia.     Review of Systems   Neurological:  Positive for numbness and headaches.       Patient Active Problem List   Diagnosis    Anogenital herpesviral infection    Anxiety with depression    Bipolar 2 disorder (Multi)    Dyspareunia in female    Fibromyalgia    GERD (gastroesophageal reflux disease)    Irregular bowel habits    Menstrual periods irregular    Raynaud disease    Pelvic pain    Joint pain    Hypermobility of joint    Palpitations    PMDD (premenstrual dysphoric disorder)     No past medical history on file.  Past Surgical History:   Procedure Laterality Date    HERNIA REPAIR      TUBAL LIGATION       Social History     Tobacco Use    Smoking status: Never     Passive exposure: Never    Smokeless tobacco: Never   Substance Use Topics    Alcohol use: Yes      Alcohol/week: 1.0 standard drink of alcohol     Types: 1 Standard drinks or equivalent per week     Comment: OCCASIONAL     family history includes Bipolar disorder in her maternal grandmother; Depression in her mother.    Allergies   Allergen Reactions    Halothane Anaphylaxis    Inhaled Anesthetics (Halogen Based) Other     hypokalemia    Nitrous Oxide Anaphylaxis    Propofol Unknown     Malignant hypothermia   Malignant hypothermia    Malignant hypothermia    Sevoflurane Unknown    Aloe Rash    Doxycycline Headache, Other, Photosensitivity and GI Upset     Other reaction(s): GI Upset   Nausea, vomiting, severe headache   Migraine    Migraine    Nausea, vomiting, severe headache    Sulfa (Sulfonamide Antibiotics) Rash       Objective   Neurological Exam    Cranial Nerves  CN II: Visual acuity is normal.  CN III, IV, VI: Extraocular movements intact bilaterally. Normal lids and orbits bilaterally. Pupils equal round and reactive to light bilaterally.  CN V: Facial sensation is normal.  CN VII: Full and symmetric facial movement.  CN VIII: Hearing is normal.  CN IX, X: Palate elevates symmetrically  CN XI: Shoulder shrug strength is normal.  CN XII: Tongue midline without atrophy or fasciculations.    Motor  Normal muscle bulk throughout. No fasciculations present. Normal muscle tone. No abnormal involuntary movements. Strength is 5/5 throughout all four extremities.    Sensory  Light touch is normal in upper and lower extremities. Vibration is normal in upper and lower extremities. Proprioception is normal in upper and lower extremities.     Reflexes  Deep tendon reflexes are 2+ and symmetric in all four extremities.    Coordination  Right: Finger-to-nose normal.Left: Finger-to-nose normal.    Gait  Casual gait is normal including stance, stride, and arm swing.    Physical Exam  Eyes:      General: Lids are normal.      Extraocular Movements: Extraocular movements intact.      Pupils: Pupils are equal, round, and  reactive to light.   Neurological:      Motor: Motor strength is normal.     Deep Tendon Reflexes: Reflexes are normal and symmetric.           Assessment/Plan

## 2024-07-17 NOTE — PROGRESS NOTES
Subjective   Patient ID: Nai Hutton is a 23 y.o. female who presents for Follow-up (Discuss ongoing issues; discuss visit with necrologist and other providers she'll be seeing; ).    Acute Neurological Problem  The patient's primary symptoms include clumsiness, focal sensory loss, focal weakness and memory loss. The patient's pertinent negatives include no altered mental status, loss of balance, near-syncope, slurred speech, syncope, visual change or weakness. This is a recurrent problem. The current episode started more than 1 year ago. The neurological problem developed gradually. The problem has been waxing and waning since onset. There was left-sided, right-sided and upper extremity focality noted. Associated symptoms include an auditory change, back pain, fatigue, headaches, light-headedness, neck pain and vertigo. Pertinent negatives include no abdominal pain, aura, bladder incontinence, bowel incontinence, chest pain, confusion, diaphoresis, dizziness, fever, nausea, palpitations, shortness of breath or vomiting. Past treatments include acetaminophen and medication. The treatment provided mild relief.      Migraines: Deep burning migraine in the base of her skull neck area. Neuro is not concerned of stroke for her with BC, okay to continue.   She states that she knows someone with chiari malformation and states that she feels like this is what is happening to her.   Started gabapentin 200mg tid, she is taking 100mg in the morning and one in the am. She states she is feeling slow and foggy and memory is terrible.   She had follow up with psych they noticed right away that she is different on this medication.   States her eyes are fine, but her prescription keep getting worse over the next couple years     She has seen Neuro for spina bifida occulta when she was 13 and never followed up with this.     Went to cardiology, everything looks okay, was told she might have Mild POTS, having some heart  flutters, no syncope.      PMDD: She gets really bad right before her periods, her moods are very bad and she has had. Seeing Dr. Hatfield, concerns for BC for a long time, concerns for stroke symptoms. Was going to try increasing dose of her Vilazodone 40mg.   Last month she was off BC and she had issues. She had been hospitalized and put on a 72 hour hold before. She will start to have symptoms up to 10 days prior to her periods, she will be suicidal at times.   She had migraine with aura in the past, OBGYN is concerned for stroke risk with DEVYN, She has been on BC since 13. Has appointment with neurology.      Pelvic pain: She had done Pelvic floor PT before for pelvic pain. Has been checked for endometriosis, getting random cramping and pains at times. She did pelvic floor in PT. She was getting more pains at times.      Hypermobility syndrome: She did see . They were unclear of ELD. She needs to get echo done. She had this scheduled but it was at [facility]. They did not want to finalize dx. She has fluctuations in her heart rate. She is getting readings of 160 at times with minimal exertion. She does not feel like she is going to pass out.     She is having issues with walking long distances, she experienced a lot of joint pains with her knee, she has feet issues that hurt in her feet. She is easily fatigued with her exertion.      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 She feels sob and she has felt like she is going to pass out with standing up. She was told to be evaluated with cardiology and possible Echo r/t hypermobility syndrome.      Multiple joint pains: She went and saw rheumatology before. [facility] [place]. She gets more flair of pain in her body with exercise. Family members have fibromyalgia. She was given flexeril and found that it would interact with her medication and could cause seizure.   She has been doing PT. Doing stretches and this has not been helping too  much. She was told that she has jaw issues.   Hypermobility, having issues pain with flexion.   Feels like her head is very heavy all the time. Holding her head up all the time is hard. Has seen a chiropractor in the past, gets minimal relief.      Ibuprofen 800mg and this is not helping much, would be open to trying flexeril      Bouts of constipation and diarrhea.  Encouraged water intake. Encouraged fiber. She will be constipated for 5 days and then she will have diarrhea. Some mucus in her stool. Slimy. She had grainy stool today. Denies blood, today it was dark. Has always been irregular. She has always gone about 4 days. Worse over the past 4 months. She has tried fiber supplements in the past. She does not drink a lot of water. She has tried to change her diet. She tried factor meals. She a slight accident recently. She has done PT for her pelvic floor.   States that she has not tried a stool softener, she gets diarrhea.   She saw GI before. She thought she had celiac and was checked for this   She took prevacid in the past      Hx of Malignant Hyperthermia       She got in with integrative health, she was recommended comprehensive health.      All other systems reviewed and negative for complaint unless stated above.     Surgery: Hernia repair   Family: MH, Mother   smoker: none     Etoh: occasional   Drug use: none     Review of Systems   Constitutional:  Positive for fatigue. Negative for diaphoresis and fever.   Respiratory:  Negative for shortness of breath.    Cardiovascular:  Negative for chest pain, palpitations and near-syncope.   Gastrointestinal:  Negative for abdominal pain, bowel incontinence, nausea and vomiting.   Genitourinary:  Negative for bladder incontinence.   Musculoskeletal:  Positive for back pain and neck pain.   Neurological:  Positive for vertigo, focal weakness, light-headedness and headaches. Negative for dizziness, syncope, weakness and loss of balance.   Psychiatric/Behavioral:   "Positive for memory loss. Negative for confusion.        Objective   BP 98/66 (BP Location: Right arm, Patient Position: Sitting, BP Cuff Size: Large adult)   Pulse 88   Ht 1.575 m (5' 2\")   Wt 69.3 kg (152 lb 12.8 oz)   BMI 27.95 kg/m²     Physical Exam  Vitals reviewed.   Constitutional:       Appearance: Normal appearance.   HENT:      Head: Normocephalic.      Right Ear: Tympanic membrane, ear canal and external ear normal.      Left Ear: Tympanic membrane, ear canal and external ear normal.      Nose: No rhinorrhea.      Mouth/Throat:      Mouth: Mucous membranes are moist.      Pharynx: Oropharynx is clear.   Eyes:      Pupils: Pupils are equal, round, and reactive to light.   Cardiovascular:      Rate and Rhythm: Normal rate and regular rhythm.      Pulses: Normal pulses.   Pulmonary:      Effort: Pulmonary effort is normal.      Breath sounds: Normal breath sounds.   Abdominal:      General: Abdomen is flat. Bowel sounds are normal.      Palpations: Abdomen is soft.   Musculoskeletal:         General: No tenderness. Normal range of motion.      Right lower leg: No edema.      Left lower leg: No edema.   Lymphadenopathy:      Cervical: No cervical adenopathy.   Skin:     General: Skin is warm and dry.      Findings: No rash.   Neurological:      Mental Status: She is alert and oriented to person, place, and time.   Psychiatric:         Mood and Affect: Mood normal.         Behavior: Behavior normal.         Assessment/Plan       #Migraine  #Head pain/pressure   Taking gabapentin, encouraged to try a couple more weeks   If intolerable on the low dose call neuro   May be getting MRI in the future    Would like to establish with a comprehensive specialist to \"Put all the pieces together to make sense\"     Recommended follow up with rheumatology     #Hypermobility syndrome   Getting echo for heart issues   Can continue flexeril   Cleared from Cardiology      #PMDD  Taking DEVYN  Has tried coming off of bc and " having severe suicidal ideations   Would recommend staying on it until eval with neuro   OBGYN was concerned for Stroke  Trying to work with OBGYN for bc to manage moods      #Pelvic floor pain   PT referral placed      #GI issues   Discussed diet  Encouraged water  Encouraged fiber  FODMAP diet provided   May need to see GI in the future      #Concerns for autism   Getting autism assessment in April   She finally found a clinic that will take her insurance   States she is still trying to get a dx     #HCM  Following with OBGYN, last pap 2/2024

## 2024-07-23 ENCOUNTER — APPOINTMENT (OUTPATIENT)
Dept: PRIMARY CARE | Facility: CLINIC | Age: 24
End: 2024-07-23
Payer: MEDICARE

## 2024-07-23 VITALS
HEIGHT: 62 IN | HEART RATE: 88 BPM | DIASTOLIC BLOOD PRESSURE: 66 MMHG | SYSTOLIC BLOOD PRESSURE: 98 MMHG | BODY MASS INDEX: 28.12 KG/M2 | WEIGHT: 152.8 LBS

## 2024-07-23 DIAGNOSIS — M79.7 FIBROMYALGIA: ICD-10-CM

## 2024-07-23 DIAGNOSIS — G43.919 INTRACTABLE MIGRAINE WITHOUT STATUS MIGRAINOSUS, UNSPECIFIED MIGRAINE TYPE: Primary | ICD-10-CM

## 2024-07-23 PROCEDURE — 99214 OFFICE O/P EST MOD 30 MIN: CPT | Performed by: REGISTERED NURSE

## 2024-07-23 PROCEDURE — 3008F BODY MASS INDEX DOCD: CPT | Performed by: REGISTERED NURSE

## 2024-07-23 ASSESSMENT — ENCOUNTER SYMPTOMS
CONFUSION: 0
ABDOMINAL PAIN: 0
PALPITATIONS: 0
BOWEL INCONTINENCE: 0
LIGHT-HEADEDNESS: 1
FATIGUE: 1
NEAR-SYNCOPE: 0
ALTERED MENTAL STATUS: 0
SLURRED SPEECH: 0
CLUMSINESS: 1
WEAKNESS: 0
NAUSEA: 0
DIZZINESS: 0
FOCAL WEAKNESS: 1
NECK PAIN: 1
VISUAL CHANGE: 0
SHORTNESS OF BREATH: 0
HEADACHES: 1
AURA: 0
FEVER: 0
BACK PAIN: 1
NEUROLOGIC COMPLAINT: 1
DIAPHORESIS: 0
FOCAL SENSORY LOSS: 1
LOSS OF BALANCE: 0
MEMORY LOSS: 1
VOMITING: 0
VERTIGO: 1

## 2024-07-23 NOTE — PATIENT INSTRUCTIONS
Rheumatology:  Conchis Gaines () [phone]  Stefani Miranda () [phone]  Saira Haji () [phone]  Tessa Moreno [phone]  Rajan Burleson [phone]    
negative

## 2024-07-25 DIAGNOSIS — R51.9 HEADACHE, UNSPECIFIED HEADACHE TYPE: Primary | ICD-10-CM

## 2024-07-30 ENCOUNTER — TELEPHONE (OUTPATIENT)
Dept: PRIMARY CARE | Facility: CLINIC | Age: 24
End: 2024-07-30
Payer: MEDICARE

## 2024-07-30 NOTE — TELEPHONE ENCOUNTER
Called Nai and gave her information for [facility] in response to her asking about comprehensive wellness clinic. She is already with integrative health through [facility].

## 2024-08-08 ENCOUNTER — HOSPITAL ENCOUNTER (OUTPATIENT)
Dept: RADIOLOGY | Facility: CLINIC | Age: 24
Discharge: HOME | End: 2024-08-08
Payer: MEDICARE

## 2024-08-08 DIAGNOSIS — R51.9 HEADACHE, UNSPECIFIED HEADACHE TYPE: ICD-10-CM

## 2024-08-08 PROCEDURE — 70553 MRI BRAIN STEM W/O & W/DYE: CPT

## 2024-08-08 PROCEDURE — 70553 MRI BRAIN STEM W/O & W/DYE: CPT | Performed by: RADIOLOGY

## 2024-08-08 PROCEDURE — A9575 INJ GADOTERATE MEGLUMI 0.1ML: HCPCS | Performed by: PSYCHIATRY & NEUROLOGY

## 2024-08-08 PROCEDURE — 2550000001 HC RX 255 CONTRASTS: Performed by: PSYCHIATRY & NEUROLOGY

## 2024-08-08 RX ORDER — GADOTERATE MEGLUMINE 376.9 MG/ML
13 INJECTION INTRAVENOUS
Status: COMPLETED | OUTPATIENT
Start: 2024-08-08 | End: 2024-08-08

## 2024-08-09 NOTE — RESULT ENCOUNTER NOTE
Please tell the patient that her MRI of the brain was normal- no evidence of a brain tumor or other mass lesion.

## 2024-08-13 ENCOUNTER — APPOINTMENT (OUTPATIENT)
Dept: INTEGRATIVE MEDICINE | Facility: CLINIC | Age: 24
End: 2024-08-13
Payer: MEDICARE

## 2024-09-03 DIAGNOSIS — Z30.41 ENCOUNTER FOR SURVEILLANCE OF CONTRACEPTIVE PILLS: ICD-10-CM

## 2024-09-03 RX ORDER — DROSPIRENONE AND ETHINYL ESTRADIOL 0.02-3(28)
1 KIT ORAL DAILY
Qty: 84 TABLET | Refills: 0 | Status: SHIPPED | OUTPATIENT
Start: 2024-09-03 | End: 2024-11-26

## 2024-09-13 DIAGNOSIS — B96.89 BACTERIAL VAGINOSIS: Primary | ICD-10-CM

## 2024-09-13 DIAGNOSIS — N76.0 BACTERIAL VAGINOSIS: Primary | ICD-10-CM

## 2024-09-13 RX ORDER — METRONIDAZOLE 500 MG/1
500 TABLET ORAL 2 TIMES DAILY
Qty: 14 TABLET | Refills: 0 | Status: SHIPPED | OUTPATIENT
Start: 2024-09-13 | End: 2024-09-20

## 2024-09-16 ENCOUNTER — APPOINTMENT (OUTPATIENT)
Dept: OTOLARYNGOLOGY | Facility: CLINIC | Age: 24
End: 2024-09-16
Payer: MEDICARE

## 2024-09-16 ENCOUNTER — CLINICAL SUPPORT (OUTPATIENT)
Dept: AUDIOLOGY | Facility: CLINIC | Age: 24
End: 2024-09-16
Payer: MEDICARE

## 2024-09-16 ENCOUNTER — LAB (OUTPATIENT)
Dept: LAB | Facility: LAB | Age: 24
End: 2024-09-16
Payer: MEDICARE

## 2024-09-16 DIAGNOSIS — J30.9 ALLERGIC RHINITIS, UNSPECIFIED SEASONALITY, UNSPECIFIED TRIGGER: ICD-10-CM

## 2024-09-16 DIAGNOSIS — H69.93 DISORDER OF BOTH EUSTACHIAN TUBES: Primary | ICD-10-CM

## 2024-09-16 DIAGNOSIS — H93.13 SUBJECTIVE TINNITUS OF BOTH EARS: Primary | ICD-10-CM

## 2024-09-16 DIAGNOSIS — Z01.10 NORMAL HEARING NOTED ON EXAMINATION: ICD-10-CM

## 2024-09-16 DIAGNOSIS — Q79.60 EHLERS-DANLOS SYNDROME (HHS-HCC): ICD-10-CM

## 2024-09-16 PROCEDURE — 92550 TYMPANOMETRY & REFLEX THRESH: CPT | Performed by: AUDIOLOGIST

## 2024-09-16 PROCEDURE — 92557 COMPREHENSIVE HEARING TEST: CPT | Performed by: AUDIOLOGIST

## 2024-09-16 PROCEDURE — 86003 ALLG SPEC IGE CRUDE XTRC EA: CPT

## 2024-09-16 PROCEDURE — 36415 COLL VENOUS BLD VENIPUNCTURE: CPT

## 2024-09-16 PROCEDURE — 1036F TOBACCO NON-USER: CPT | Performed by: OTOLARYNGOLOGY

## 2024-09-16 PROCEDURE — 82785 ASSAY OF IGE: CPT

## 2024-09-16 PROCEDURE — 99204 OFFICE O/P NEW MOD 45 MIN: CPT | Performed by: OTOLARYNGOLOGY

## 2024-09-16 ASSESSMENT — PAIN - FUNCTIONAL ASSESSMENT: PAIN_FUNCTIONAL_ASSESSMENT: 0-10

## 2024-09-16 ASSESSMENT — PAIN SCALES - GENERAL: PAINLEVEL_OUTOF10: 0 - NO PAIN

## 2024-09-16 NOTE — PROGRESS NOTES
"History Of Present Illness  Nai Hutton is a 23 y.o. female presenting with: \"Referral from urgent care, ear trouble\". She is kindly referred by Dr. Viktoriya Mosqueda, APRN-CNP.    In December 2023, she felt like she had an ear infection. She has used an oral antibiotic, she developed a rash so she stopped alton. Couple of months later (May 2024?) still felt like her ears are plugged. Her tinnitus was getting worse. She went to urgent care, her ears were normal on examination. She was recommended to use flonase and zyrtec. She has not noticed any improvement. Her ears feel clogged and off and on they crack.     Her nose feels congested frequently.   She also has sinus pressure. Nasal dryness (+).  She is not a smoker.     On examination, TMs look intact bilaterally.   Tenderness at right postauricular sulcus (+).  Dryness crusting (+) over nasal septum mucosa.     My impression she may have ETD.  Allergic rhinitis, nasal congestion    Plan:  1- allergy test  2- hearing test     Past Medical History  She has no past medical history on file.    Surgical History  She has a past surgical history that includes Hernia repair and Tubal ligation.     Social History  She reports that she has never smoked. She has never been exposed to tobacco smoke. She has never used smokeless tobacco. She reports current alcohol use of about 1.0 standard drink of alcohol per week. She reports that she does not currently use drugs after having used the following drugs: Marijuana.    Family History  Family History   Problem Relation Name Age of Onset    Depression Mother Allyson     Bipolar disorder Maternal Grandmother          Allergies  Halothane, Inhaled anesthetics (halogen based), Nitrous oxide, Propofol, Sevoflurane, Aloe, Doxycycline, and Sulfa (sulfonamide antibiotics)    Review of Systems   Difficulty hearing  Ear pain  Tinnitus  Ears feel full  Sinus pressure  Sore throat  Dry mouth/mouth breathing  Dizziness upon " "standing  Heartburn  Nausea  Change in appetite  Headache  Migraines     Physical Exam    General appearance: Healthy-appearing, well-nourished, well groomed, in no acute distress.     Head and Face: Atraumatic with no masses, lesions, or scarring.      Salivary glands: No tenderness of the parotid glands or parotid masses.     No tenderness of the submandibular glands or submandibular masses.      Facial strength: Normal strength and symmetry, no synkinesis or facial tic.     Eyes: Conjunctivas look non-hyperemic bilaterally    Ears: Bilaterally ear canals look normal. Tympanic membranes look intact, no hyperemia, fluid or retraction. Hearing grossly normal.      Nose: Dryness (+) crusting, Left inferior turbinate looks congested.     Oral Cavity/Mouth: Lips and tongue look normal.     Throat: No postnasal discharge. No tonsil hypertrophy. No hyperemia.    Neck: Symmetrical, trachea midline.     Pulmonary: Normal respiratory effort.     Lymphatic: No palpable pathologic lymph nodes at neck.     Neurological/Psychiatric Orientation to person, place, and time: Normal.     Mood and affect: Normal.      Extremities: No clubbing.     Skin: No significant skin lesions were noted at face or neck        Procedure         Last Recorded Vitals  There were no vitals taken for this visit.    Relevant Results    Assessment and Plan:  Nai Hutton is a 23 y.o. female presenting with: \"Referral from urgent care, ear trouble\". She is kindly referred by Dr. Viktoriya Mosqueda, APRN-CNP.    In December 2023, she felt like she had an ear infection. She has used an oral antibiotic, she developed a rash so she stopped alton. Couple of months later (May 2024?) still felt like her ears are plugged. Her tinnitus was getting worse. She went to urgent care, her ears were normal on examination. She was recommended to use flonase and zyrtec. She has not noticed any improvement. Her ears feel clogged and off and on they crack. "     Her nose feels congested frequently.   She also has sinus pressure. Nasal dryness (+).  She is not a smoker.     On examination, TMs look intact bilaterally.   Tenderness at right postauricular sulcus (+).  Dryness crusting (+) over nasal septum mucosa.     My impression she may have ETD.  Allergic rhinitis, nasal congestion    Plan:  1- allergy test  2- hearing test      Bre Mujica  Otolaryngology - Head & Neck Surgery

## 2024-09-16 NOTE — PROGRESS NOTES
Nai Hutton, age 23 years, is here today for a hearing evaluation.     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    Behavioral hearing evaluation:  Right ear - borderline normal hearing sensitivity 125-250 Hz rising to normal hearing sensitivity 500-8000 Hz   Left ear - mild hearing loss 125-500 Hz rising to normal hearing sensitivity 9986-3271 Hz     Speech reception thresholds obtained at a level consistent with pure tone thresholds bilaterally.    Word discrimination:  Right ear - excellent (100%)  Left ear - excellent (100%)    Tympanometry:  Right ear - Type A, normal middle ear function  Left ear - Type A, normal middle ear function    Ipsilateral acoustic reflexes:  Probe right - present 500-4000 Hz  Probe left - present 500-4000 Hz    Contralateral acoustic reflexes:  Probe right - present 500-4000 Hz  Probe left - present 500-4000 Hz  The presence of acoustic reflexes within normal intensity limits is consistent with normal middle ear and brainstem function, and suggests that auditory sensitivity is not significantly impaired.     Distortion Product Otoacoustic Emissions (DPOAEs) present 4891-8763 Hz bilaterally.  Present DPOAEs are consistent with normal cochlear outer hair cell function at those frequencies    Recommendations:  1) Re-evaluate hearing annually, to monitor, or sooner if a change in  hearing is noted

## 2024-09-18 LAB
A ALTERNATA IGE QN: <0.1 KU/L
A FUMIGATUS IGE QN: <0.1 KU/L
BERMUDA GRASS IGE QN: 4.19 KU/L
BOXELDER IGE QN: <0.1 KU/L
C HERBARUM IGE QN: <0.1 KU/L
CALIF WALNUT POLN IGE QN: 0.19 KU/L
CAT DANDER IGE QN: 0.11 KU/L
CLAM IGE QN: <0.1 KU/L
CMN PIGWEED IGE QN: 0.41 KU/L
CODFISH IGE QN: <0.1 KU/L
COMMON RAGWEED IGE QN: 0.87 KU/L
CORN IGE QN: <0.1
COTTONWOOD IGE QN: 0.25 KU/L
D FARINAE IGE QN: <0.1 KU/L
D PTERONYSS IGE QN: <0.1 KU/L
DOG DANDER IGE QN: <0.1 KU/L
EGG WHITE IGE QN: <0.1 KU/L
ENGL PLANTAIN IGE QN: 0.16 KU/L
GOOSEFOOT IGE QN: <0.1 KU/L
JOHNSON GRASS IGE QN: 2.42 KU/L
KENT BLUE GRASS IGE QN: 13.5 KU/L
LONDON PLANE IGE QN: <0.1 KU/L
MILK IGE QN: <0.1 KU/L
MT JUNIPER IGE QN: <0.1 KU/L
P NOTATUM IGE QN: <0.1 KU/L
PEANUT IGE QN: <0.1 KU/L
PECAN/HICK TREE IGE QN: 0.47 KU/L
ROACH IGE QN: <0.1 KU/L
SALTWORT IGE QN: 1.04 KU/L
SCALLOP IGE QN: <0.1 KU/L
SESAME SEED IGE QN: <0.1 KU/L
SHEEP SORREL IGE QN: <0.1 KU/L
SHRIMP IGE QN: <0.1 KU/L
SILVER BIRCH IGE QN: <0.1 KU/L
SOYBEAN IGE QN: <0.1 KU/L
TIMOTHY IGE QN: 7.92 KU/L
TOTAL IGE SMQN RAST: 42 KU/L
WALNUT IGE QN: <0.1 KU/L
WHEAT IGE QN: 0.13 KU/L
WHITE ASH IGE QN: 1.35 KU/L
WHITE ELM IGE QN: 0.13 KU/L
WHITE MULBERRY IGE QN: <0.1 KU/L
WHITE OAK IGE QN: <0.1 KU/L

## 2024-09-19 RX ORDER — PREDNISONE 10 MG/1
TABLET ORAL
Qty: 34 TABLET | Refills: 0 | Status: SHIPPED | OUTPATIENT
Start: 2024-09-19

## 2024-10-02 ENCOUNTER — APPOINTMENT (OUTPATIENT)
Dept: NEUROLOGY | Facility: CLINIC | Age: 24
End: 2024-10-02
Payer: MEDICARE

## 2024-10-31 ASSESSMENT — ENCOUNTER SYMPTOMS
FLANK PAIN: 0
HEMATURIA: 0
CONSTIPATION: 0
ABDOMINAL PAIN: 0
ANOREXIA: 0
CHILLS: 0
SORE THROAT: 0
BACK PAIN: 0
DYSURIA: 0
DIARRHEA: 0
HEADACHES: 1
FEVER: 0
NAUSEA: 0
FREQUENCY: 0
VOMITING: 0

## 2024-11-02 ENCOUNTER — OFFICE VISIT (OUTPATIENT)
Dept: OBSTETRICS AND GYNECOLOGY | Facility: CLINIC | Age: 24
End: 2024-11-02
Payer: MEDICARE

## 2024-11-02 VITALS
SYSTOLIC BLOOD PRESSURE: 112 MMHG | HEART RATE: 85 BPM | DIASTOLIC BLOOD PRESSURE: 69 MMHG | HEIGHT: 62 IN | WEIGHT: 157 LBS | TEMPERATURE: 97 F | BODY MASS INDEX: 28.89 KG/M2

## 2024-11-02 DIAGNOSIS — L81.6 HYPOPIGMENTATION OF SKIN: ICD-10-CM

## 2024-11-02 DIAGNOSIS — N90.89 VULVAR IRRITATION: Primary | ICD-10-CM

## 2024-11-02 RX ORDER — CLOBETASOL PROPIONATE 0.5 MG/G
CREAM TOPICAL 2 TIMES DAILY
Qty: 30 G | Refills: 0 | Status: SHIPPED | OUTPATIENT
Start: 2024-11-02

## 2024-11-02 ASSESSMENT — ENCOUNTER SYMPTOMS
FREQUENCY: 0
VOMITING: 0
NAUSEA: 0
DYSURIA: 0
ANOREXIA: 0
CONSTIPATION: 0
DIARRHEA: 0
BACK PAIN: 0
ABDOMINAL PAIN: 0
HEADACHES: 1
CHILLS: 0
SORE THROAT: 0
FLANK PAIN: 0
HEMATURIA: 0
FEVER: 0

## 2024-12-03 DIAGNOSIS — Z30.41 ENCOUNTER FOR SURVEILLANCE OF CONTRACEPTIVE PILLS: ICD-10-CM

## 2024-12-03 RX ORDER — DROSPIRENONE AND ETHINYL ESTRADIOL 0.02-3(28)
1 KIT ORAL DAILY
Qty: 84 TABLET | Refills: 0 | Status: SHIPPED | OUTPATIENT
Start: 2024-12-03

## 2025-01-13 ENCOUNTER — HOSPITAL ENCOUNTER (OUTPATIENT)
Dept: RADIOLOGY | Facility: HOSPITAL | Age: 25
Discharge: HOME | End: 2025-01-13
Payer: MEDICARE

## 2025-01-13 DIAGNOSIS — R10.2 PELVIC PAIN: ICD-10-CM

## 2025-01-13 PROCEDURE — 76830 TRANSVAGINAL US NON-OB: CPT | Performed by: STUDENT IN AN ORGANIZED HEALTH CARE EDUCATION/TRAINING PROGRAM

## 2025-01-13 PROCEDURE — 76856 US EXAM PELVIC COMPLETE: CPT | Performed by: STUDENT IN AN ORGANIZED HEALTH CARE EDUCATION/TRAINING PROGRAM

## 2025-01-13 PROCEDURE — 76856 US EXAM PELVIC COMPLETE: CPT

## 2025-01-28 DIAGNOSIS — U07.1 COVID-19: Primary | ICD-10-CM

## 2025-01-28 RX ORDER — NIRMATRELVIR AND RITONAVIR 300-100 MG
3 KIT ORAL 2 TIMES DAILY
Qty: 30 TABLET | Refills: 0 | Status: SHIPPED | OUTPATIENT
Start: 2025-01-28 | End: 2025-02-02

## 2025-01-29 DIAGNOSIS — R93.89 ABNORMAL FINDINGS ON IMAGING TEST: Primary | ICD-10-CM

## 2025-02-03 ENCOUNTER — APPOINTMENT (OUTPATIENT)
Dept: OBSTETRICS AND GYNECOLOGY | Facility: CLINIC | Age: 25
End: 2025-02-03
Payer: MEDICARE

## 2025-02-10 ENCOUNTER — LAB (OUTPATIENT)
Dept: LAB | Facility: HOSPITAL | Age: 25
End: 2025-02-10
Payer: MEDICARE

## 2025-02-10 DIAGNOSIS — R93.89 ABNORMAL FINDINGS ON IMAGING TEST: ICD-10-CM

## 2025-02-12 LAB
APPEARANCE UR: CLEAR
BACTERIA #/AREA URNS HPF: ABNORMAL /HPF
BACTERIA UR CULT: ABNORMAL
BILIRUB UR QL STRIP: NEGATIVE
COLOR UR: YELLOW
GLUCOSE UR QL STRIP: NEGATIVE
HGB UR QL STRIP: NEGATIVE
HYALINE CASTS #/AREA URNS LPF: ABNORMAL /LPF
KETONES UR QL STRIP: NEGATIVE
LEUKOCYTE ESTERASE UR QL STRIP: ABNORMAL
NITRITE UR QL STRIP: NEGATIVE
PH UR STRIP: 7 [PH] (ref 5–8)
PROT UR QL STRIP: NEGATIVE
RBC #/AREA URNS HPF: ABNORMAL /HPF
SERVICE CMNT-IMP: ABNORMAL
SP GR UR STRIP: 1.01 (ref 1–1.03)
SQUAMOUS #/AREA URNS HPF: ABNORMAL /HPF
WBC #/AREA URNS HPF: ABNORMAL /HPF

## 2025-02-14 ENCOUNTER — PATIENT MESSAGE (OUTPATIENT)
Dept: PRIMARY CARE | Facility: CLINIC | Age: 25
End: 2025-02-14
Payer: MEDICARE

## 2025-02-14 DIAGNOSIS — R82.71 BACTERIA IN URINE: Primary | ICD-10-CM

## 2025-02-14 RX ORDER — NITROFURANTOIN 25; 75 MG/1; MG/1
100 CAPSULE ORAL 2 TIMES DAILY
Qty: 10 CAPSULE | Refills: 0 | Status: SHIPPED | OUTPATIENT
Start: 2025-02-14 | End: 2025-02-19

## 2025-02-25 DIAGNOSIS — B37.31 VAGINAL CANDIDIASIS: Primary | ICD-10-CM

## 2025-02-25 RX ORDER — FLUCONAZOLE 150 MG/1
150 TABLET ORAL ONCE
Qty: 1 TABLET | Refills: 0 | Status: SHIPPED | OUTPATIENT
Start: 2025-02-25 | End: 2025-02-25

## 2025-02-28 ENCOUNTER — HOSPITAL ENCOUNTER (EMERGENCY)
Facility: HOSPITAL | Age: 25
Discharge: HOME | End: 2025-02-28
Attending: STUDENT IN AN ORGANIZED HEALTH CARE EDUCATION/TRAINING PROGRAM
Payer: MEDICARE

## 2025-02-28 ENCOUNTER — APPOINTMENT (OUTPATIENT)
Dept: RADIOLOGY | Facility: HOSPITAL | Age: 25
End: 2025-02-28
Payer: MEDICARE

## 2025-02-28 VITALS
DIASTOLIC BLOOD PRESSURE: 57 MMHG | OXYGEN SATURATION: 96 % | WEIGHT: 154.1 LBS | RESPIRATION RATE: 19 BRPM | HEIGHT: 62 IN | TEMPERATURE: 97.9 F | BODY MASS INDEX: 28.36 KG/M2 | HEART RATE: 84 BPM | SYSTOLIC BLOOD PRESSURE: 94 MMHG

## 2025-02-28 DIAGNOSIS — R10.84 ABDOMINAL PAIN, GENERALIZED: Primary | ICD-10-CM

## 2025-02-28 DIAGNOSIS — R50.9 FEVER, UNSPECIFIED FEVER CAUSE: ICD-10-CM

## 2025-02-28 DIAGNOSIS — R19.7 DIARRHEA, UNSPECIFIED TYPE: ICD-10-CM

## 2025-02-28 LAB
ALBUMIN SERPL BCP-MCNC: 4.3 G/DL (ref 3.4–5)
ALP SERPL-CCNC: 48 U/L (ref 33–110)
ALT SERPL W P-5'-P-CCNC: 9 U/L (ref 7–45)
ANION GAP SERPL CALC-SCNC: 14 MMOL/L (ref 10–20)
APPEARANCE UR: ABNORMAL
AST SERPL W P-5'-P-CCNC: 13 U/L (ref 9–39)
B-HCG SERPL-ACNC: <2 MIU/ML
BASOPHILS # BLD AUTO: 0.02 X10*3/UL (ref 0–0.1)
BASOPHILS NFR BLD AUTO: 0.3 %
BILIRUB SERPL-MCNC: 0.5 MG/DL (ref 0–1.2)
BILIRUB UR STRIP.AUTO-MCNC: NEGATIVE MG/DL
BUN SERPL-MCNC: 13 MG/DL (ref 6–23)
CALCIUM SERPL-MCNC: 9.1 MG/DL (ref 8.6–10.3)
CHLORIDE SERPL-SCNC: 100 MMOL/L (ref 98–107)
CO2 SERPL-SCNC: 25 MMOL/L (ref 21–32)
COLOR UR: YELLOW
CREAT SERPL-MCNC: 0.8 MG/DL (ref 0.5–1.05)
EGFRCR SERPLBLD CKD-EPI 2021: >90 ML/MIN/1.73M*2
EOSINOPHIL # BLD AUTO: 0.08 X10*3/UL (ref 0–0.7)
EOSINOPHIL NFR BLD AUTO: 1.1 %
ERYTHROCYTE [DISTWIDTH] IN BLOOD BY AUTOMATED COUNT: 12.5 % (ref 11.5–14.5)
FLUAV RNA RESP QL NAA+PROBE: NOT DETECTED
FLUBV RNA RESP QL NAA+PROBE: NOT DETECTED
GLUCOSE SERPL-MCNC: 94 MG/DL (ref 74–99)
GLUCOSE UR STRIP.AUTO-MCNC: NEGATIVE MG/DL
HCT VFR BLD AUTO: 40.4 % (ref 36–46)
HGB BLD-MCNC: 13.3 G/DL (ref 12–16)
HOLD SPECIMEN: NORMAL
IMM GRANULOCYTES # BLD AUTO: 0.02 X10*3/UL (ref 0–0.7)
IMM GRANULOCYTES NFR BLD AUTO: 0.3 % (ref 0–0.9)
KETONES UR STRIP.AUTO-MCNC: ABNORMAL MG/DL
LEUKOCYTE ESTERASE UR QL STRIP.AUTO: NEGATIVE
LIPASE SERPL-CCNC: 10 U/L (ref 9–82)
LYMPHOCYTES # BLD AUTO: 0.81 X10*3/UL (ref 1.2–4.8)
LYMPHOCYTES NFR BLD AUTO: 11 %
MCH RBC QN AUTO: 28.5 PG (ref 26–34)
MCHC RBC AUTO-ENTMCNC: 32.9 G/DL (ref 32–36)
MCV RBC AUTO: 87 FL (ref 80–100)
MONOCYTES # BLD AUTO: 0.48 X10*3/UL (ref 0.1–1)
MONOCYTES NFR BLD AUTO: 6.5 %
NEUTROPHILS # BLD AUTO: 5.95 X10*3/UL (ref 1.2–7.7)
NEUTROPHILS NFR BLD AUTO: 80.8 %
NITRITE UR QL STRIP.AUTO: NEGATIVE
NRBC BLD-RTO: 0 /100 WBCS (ref 0–0)
PH UR STRIP.AUTO: 5 [PH]
PLATELET # BLD AUTO: 329 X10*3/UL (ref 150–450)
POTASSIUM SERPL-SCNC: 3.6 MMOL/L (ref 3.5–5.3)
PROT SERPL-MCNC: 7.5 G/DL (ref 6.4–8.2)
PROT UR STRIP.AUTO-MCNC: NEGATIVE MG/DL
RBC # BLD AUTO: 4.66 X10*6/UL (ref 4–5.2)
RBC # UR STRIP.AUTO: NEGATIVE MG/DL
SARS-COV-2 RNA RESP QL NAA+PROBE: NOT DETECTED
SODIUM SERPL-SCNC: 135 MMOL/L (ref 136–145)
SP GR UR STRIP.AUTO: 1.02
UROBILINOGEN UR STRIP.AUTO-MCNC: <2 MG/DL
WBC # BLD AUTO: 7.4 X10*3/UL (ref 4.4–11.3)

## 2025-02-28 PROCEDURE — 74177 CT ABD & PELVIS W/CONTRAST: CPT | Performed by: RADIOLOGY

## 2025-02-28 PROCEDURE — 2500000004 HC RX 250 GENERAL PHARMACY W/ HCPCS (ALT 636 FOR OP/ED): Performed by: STUDENT IN AN ORGANIZED HEALTH CARE EDUCATION/TRAINING PROGRAM

## 2025-02-28 PROCEDURE — 2550000001 HC RX 255 CONTRASTS: Performed by: STUDENT IN AN ORGANIZED HEALTH CARE EDUCATION/TRAINING PROGRAM

## 2025-02-28 PROCEDURE — 99285 EMERGENCY DEPT VISIT HI MDM: CPT | Mod: 25 | Performed by: STUDENT IN AN ORGANIZED HEALTH CARE EDUCATION/TRAINING PROGRAM

## 2025-02-28 PROCEDURE — 87636 SARSCOV2 & INF A&B AMP PRB: CPT | Performed by: EMERGENCY MEDICINE

## 2025-02-28 PROCEDURE — 85025 COMPLETE CBC W/AUTO DIFF WBC: CPT | Performed by: STUDENT IN AN ORGANIZED HEALTH CARE EDUCATION/TRAINING PROGRAM

## 2025-02-28 PROCEDURE — 96375 TX/PRO/DX INJ NEW DRUG ADDON: CPT

## 2025-02-28 PROCEDURE — 83690 ASSAY OF LIPASE: CPT | Performed by: STUDENT IN AN ORGANIZED HEALTH CARE EDUCATION/TRAINING PROGRAM

## 2025-02-28 PROCEDURE — 80053 COMPREHEN METABOLIC PANEL: CPT | Performed by: STUDENT IN AN ORGANIZED HEALTH CARE EDUCATION/TRAINING PROGRAM

## 2025-02-28 PROCEDURE — 96374 THER/PROPH/DIAG INJ IV PUSH: CPT

## 2025-02-28 PROCEDURE — 74177 CT ABD & PELVIS W/CONTRAST: CPT

## 2025-02-28 PROCEDURE — 81003 URINALYSIS AUTO W/O SCOPE: CPT | Performed by: STUDENT IN AN ORGANIZED HEALTH CARE EDUCATION/TRAINING PROGRAM

## 2025-02-28 PROCEDURE — 36415 COLL VENOUS BLD VENIPUNCTURE: CPT | Performed by: STUDENT IN AN ORGANIZED HEALTH CARE EDUCATION/TRAINING PROGRAM

## 2025-02-28 PROCEDURE — 84702 CHORIONIC GONADOTROPIN TEST: CPT | Performed by: STUDENT IN AN ORGANIZED HEALTH CARE EDUCATION/TRAINING PROGRAM

## 2025-02-28 PROCEDURE — 2500000001 HC RX 250 WO HCPCS SELF ADMINISTERED DRUGS (ALT 637 FOR MEDICARE OP): Performed by: EMERGENCY MEDICINE

## 2025-02-28 RX ORDER — ESCITALOPRAM OXALATE 10 MG/1
10 TABLET ORAL DAILY
COMMUNITY

## 2025-02-28 RX ORDER — ONDANSETRON 4 MG/1
4 TABLET, FILM COATED ORAL EVERY 6 HOURS
Qty: 12 TABLET | Refills: 0 | Status: SHIPPED | OUTPATIENT
Start: 2025-02-28 | End: 2025-03-03

## 2025-02-28 RX ORDER — MORPHINE SULFATE 4 MG/ML
4 INJECTION INTRAVENOUS ONCE
Status: COMPLETED | OUTPATIENT
Start: 2025-02-28 | End: 2025-02-28

## 2025-02-28 RX ORDER — ONDANSETRON HYDROCHLORIDE 2 MG/ML
4 INJECTION, SOLUTION INTRAVENOUS ONCE
Status: COMPLETED | OUTPATIENT
Start: 2025-02-28 | End: 2025-02-28

## 2025-02-28 RX ORDER — ACETAMINOPHEN 325 MG/1
975 TABLET ORAL ONCE
Status: COMPLETED | OUTPATIENT
Start: 2025-02-28 | End: 2025-02-28

## 2025-02-28 RX ADMIN — ONDANSETRON 4 MG: 2 INJECTION INTRAMUSCULAR; INTRAVENOUS at 19:51

## 2025-02-28 RX ADMIN — MORPHINE SULFATE 4 MG: 4 INJECTION, SOLUTION INTRAMUSCULAR; INTRAVENOUS at 19:51

## 2025-02-28 RX ADMIN — IOHEXOL 75 ML: 350 INJECTION, SOLUTION INTRAVENOUS at 20:35

## 2025-02-28 RX ADMIN — ACETAMINOPHEN 975 MG: 325 TABLET ORAL at 22:18

## 2025-02-28 ASSESSMENT — COLUMBIA-SUICIDE SEVERITY RATING SCALE - C-SSRS
6. HAVE YOU EVER DONE ANYTHING, STARTED TO DO ANYTHING, OR PREPARED TO DO ANYTHING TO END YOUR LIFE?: NO
2. HAVE YOU ACTUALLY HAD ANY THOUGHTS OF KILLING YOURSELF?: NO
1. IN THE PAST MONTH, HAVE YOU WISHED YOU WERE DEAD OR WISHED YOU COULD GO TO SLEEP AND NOT WAKE UP?: NO

## 2025-02-28 ASSESSMENT — PAIN SCALES - GENERAL
PAINLEVEL_OUTOF10: 6
PAINLEVEL_OUTOF10: 6
PAINLEVEL_OUTOF10: 3
PAINLEVEL_OUTOF10: 3

## 2025-02-28 ASSESSMENT — PAIN DESCRIPTION - LOCATION
LOCATION: ABDOMEN
LOCATION: ABDOMEN

## 2025-02-28 ASSESSMENT — PAIN - FUNCTIONAL ASSESSMENT: PAIN_FUNCTIONAL_ASSESSMENT: 0-10

## 2025-02-28 ASSESSMENT — PAIN DESCRIPTION - ORIENTATION: ORIENTATION: UPPER;MID

## 2025-02-28 ASSESSMENT — PAIN DESCRIPTION - DESCRIPTORS: DESCRIPTORS: SQUEEZING

## 2025-03-01 LAB — HOLD SPECIMEN: NORMAL

## 2025-03-01 NOTE — DISCHARGE INSTRUCTIONS
Tylenol Motrin as needed for fever and pain.    Return to the emergency department for uncontrolled fever, uncontrolled vomiting, vomiting blood, blood in stool, worsening pain.

## 2025-03-01 NOTE — ED TRIAGE NOTES
"Pt to ED for reports of upper abd pain since 0600 and diarrhea x 2 days. Bodyaches and febrile on arrival, did not medicate for this.  Hx of hernia repair at the site of pain. Describes pain as \"squeezing\" and hx of tubal ligation  "

## 2025-03-01 NOTE — PROGRESS NOTES
"Nai Hutton is a 24 y.o. female on day 0 of admission presenting with diarrhea for the past 3 days and lower abdominal pain today.  She has had nausea but no vomiting.  She does not believe she has had any fever.  Patient was initially evaluated by Dr. Alfaro.  Please see her note for full details the H&P.  Patient signed out to me at change of shift pending CT scanning.      Subjective   Patient has developed fever in the emergency department Tylenol ordered.  She states she has not been running a fever at home.  Says the pain and nausea been better since she was given medication by Dr. Alfaro.       Objective     Physical Exam  Vitals reviewed.   Constitutional:       Appearance: She is well-developed.   HENT:      Head: Normocephalic.   Eyes:      Extraocular Movements: Extraocular movements intact.   Cardiovascular:      Rate and Rhythm: Normal rate and regular rhythm.      Heart sounds: Normal heart sounds.   Pulmonary:      Effort: Pulmonary effort is normal.      Breath sounds: Normal breath sounds.   Abdominal:      General: Abdomen is flat. Bowel sounds are normal.      Palpations: Abdomen is soft.      Tenderness: There is no abdominal tenderness.   Skin:     General: Skin is warm and dry.   Neurological:      Mental Status: She is alert.         Last Recorded Vitals  Blood pressure 103/64, pulse 86, temperature (!) 38.2 °C (100.7 °F), temperature source Oral, resp. rate 20, height 1.575 m (5' 2\"), weight 69.9 kg (154 lb 1.6 oz), SpO2 98%.  Intake/Output last 3 Shifts:  No intake/output data recorded.    Relevant Results                 Labs Reviewed   CBC WITH AUTO DIFFERENTIAL - Abnormal       Result Value    WBC 7.4      nRBC 0.0      RBC 4.66      Hemoglobin 13.3      Hematocrit 40.4      MCV 87      MCH 28.5      MCHC 32.9      RDW 12.5      Platelets 329      Neutrophils % 80.8      Immature Granulocytes %, Automated 0.3      Lymphocytes % 11.0      Monocytes % 6.5      Eosinophils % " 1.1      Basophils % 0.3      Neutrophils Absolute 5.95      Immature Granulocytes Absolute, Automated 0.02      Lymphocytes Absolute 0.81 (*)     Monocytes Absolute 0.48      Eosinophils Absolute 0.08      Basophils Absolute 0.02     COMPREHENSIVE METABOLIC PANEL - Abnormal    Glucose 94      Sodium 135 (*)     Potassium 3.6      Chloride 100      Bicarbonate 25      Anion Gap 14      Urea Nitrogen 13      Creatinine 0.80      eGFR >90      Calcium 9.1      Albumin 4.3      Alkaline Phosphatase 48      Total Protein 7.5      AST 13      Bilirubin, Total 0.5      ALT 9     URINALYSIS WITH REFLEX CULTURE AND MICROSCOPIC - Abnormal    Color, Urine Yellow      Appearance, Urine Hazy (*)     Specific Gravity, Urine 1.025      pH, Urine 5.0      Protein, Urine NEGATIVE      Glucose, Urine NEGATIVE      Blood, Urine NEGATIVE      Ketones, Urine 20 (1+) (*)     Bilirubin, Urine NEGATIVE      Urobilinogen, Urine <2.0      Nitrite, Urine NEGATIVE      Leukocyte Esterase, Urine NEGATIVE     LIPASE - Normal    Lipase 10      Narrative:     Venipuncture immediately after or during the administration of Metamizole may lead to falsely low results. Testing should be performed immediately prior to Metamizole dosing.   HUMAN CHORIONIC GONADOTROPIN, SERUM QUANTITATIVE - Normal    HCG, Beta-Quantitative <2      Narrative:      Total HCG measurement is performed using the [employer] Access   Immunoassay which detects intact HCG and free beta HCG subunit.    This test is not indicated for use as a tumor marker.   HCG testing is performed using a different test methodology at [facility] than other [facility]. Direct result comparison   should only be made within the same method.       SARS-COV-2 AND INFLUENZA A/B PCR - Normal    Flu A Result Not Detected      Flu B Result Not Detected      Coronavirus 2019, PCR Not Detected      Narrative:     This assay is an FDA-cleared, in vitro diagnostic nucleic acid  amplification test for the qualitative detection and differentiation of SARS CoV-2/ Influenza A/B from nasopharyngeal specimens collected from individuals with signs and symptoms of respiratory tract infections, and has been validated for use at [facility]. Negative results do not preclude COVID-19/ Influenza A/B infections and should not be used as the sole basis for diagnosis, treatment, or other management decisions. Testing for SARS CoV-2 is recommended only for patients who meet current clinical and/or epidemiological criteria defined by federal, state, or local public health directives.   GRAY TOP    Extra Tube Hold for add-ons.     URINALYSIS WITH REFLEX CULTURE AND MICROSCOPIC    Narrative:     The following orders were created for panel order Urinalysis with Reflex Culture and Microscopic.  Procedure                               Abnormality         Status                     ---------                               -----------         ------                     Urinalysis with Reflex C...[[id number]]  Abnormal            Final result               Extra Urine Gray Tube[[id number]]                            In process                   Please view results for these tests on the individual orders.   EXTRA URINE GRAY TUBE     ED Medication Administration from 02/28/2025 1905 to 02/28/2025 2331         Date/Time Order Dose Route Action Action by     02/28/2025 1951 EST morphine injection 4 mg 4 mg intravenous Given ABA Ferrell     02/28/2025 1951 EST ondansetron (Zofran) injection 4 mg 4 mg intravenous Given ABA Ferrell     02/28/2025 2035 EST iohexol (OMNIPaque) 350 mg iodine/mL solution 75 mL 75 mL intravenous Given ALANIS Murphy     02/28/2025 2218 EST acetaminophen (Tylenol) tablet 975 mg 975 mg oral Given RADHA Marion          CT abdomen pelvis w IV contrast   Final Result   1.  No CT evidence of acute subdiaphragmatic abnormality.             Signed by: William Irvin 2/28/2025 9:26 PM    Dictation workstation:   [id number]                     Assessment/Plan   Assessment & Plan  Abdominal pain, generalized    Diarrhea, unspecified type    Fever, unspecified fever cause    Patient presents emergency department the above history and physical.  No signs of sepsis, dehydration.  No acute abdomen.  No tenderness on examination at this time.  Patient did develop fever and this was treated with oral Tylenol.  Influenza COVID swab negative.    CT is negative.  No peripheral leukocytosis.  No significant electrolyte abnormalities.  Urinalysis shows no evidence of infection.  CT scan of abdomen pelvis obtained and read by radiology no CT evidence of acute sup diaphragmatic abnormality.    Suspect viral etiology for the patient's symptoms given the antecedent diarrhea and fever.  Patient given prescription for oral Zofran.  She declines work note.  Symptomatic treatment at home advised.    Results of exam and any testing were discussed with patient/family. To the best of my ability, I answered all questions. At this time, there is no indication for admission/transfer or further diagnostic testing. Patient understands to return for any new or worsening symptoms, or failure to improve as anticipated. The importance of follow-up was stressed.       I spent 0 minutes in the critical care of this patient.      Jigna Parkinson MD

## 2025-03-01 NOTE — ED PROVIDER NOTES
Chief Complaint: Abdominal pain.  HPI: This is a 24-year-old female, presenting to the emergency department for abdominal pain.  Patient states the abdominal pain started in her upper abdomen, and is now in her lower abdomen and right lower quadrant.  Patient states that is associated with diarrhea.  She denies any nausea or vomiting.  She denies any chest pain.  She denies any fevers or chills.    History reviewed. No pertinent past medical history.   Past Surgical History:   Procedure Laterality Date    HERNIA REPAIR      TUBAL LIGATION         Physical Exam  Vitals and nursing note reviewed.   Constitutional:       Appearance: Normal appearance.   HENT:      Head: Normocephalic and atraumatic.      Mouth/Throat:      Mouth: Mucous membranes are moist.   Eyes:      Conjunctiva/sclera: Conjunctivae normal.   Cardiovascular:      Rate and Rhythm: Normal rate.   Pulmonary:      Effort: Pulmonary effort is normal.   Abdominal:      General: Abdomen is flat.      Palpations: Abdomen is soft.      Tenderness: There is abdominal tenderness in the right lower quadrant. There is guarding. There is no rebound.   Musculoskeletal:         General: Normal range of motion.      Cervical back: Normal range of motion.   Skin:     General: Skin is warm and dry.   Neurological:      General: No focal deficit present.      Mental Status: She is alert.   Psychiatric:         Mood and Affect: Mood normal.            ED Course/MDM       This is a 24 y.o. female presenting to the ED ***      Escalation of Care: Appropriate for ***    Social Determinants of Health Significantly Affecting Care: ***    Prescription Drug Consideration: ***    Critical care time: ***    Final Impression  1. ***  Disposition/Plan: ***  Condition at disposition: Stable.     Sheree Alfaro DO  Emergency Medicine Physician   lower quadrant with some guarding.  Lab work and imaging were ordered, and are pending at time of signout to the oncoming ED team.  Patient was signed out in stable condition    Final Impression  1.  Abdominal pain  Disposition/Plan: Signout  Condition at disposition: Stable.     Sheree Alfaro DO  Emergency Medicine Physician     Sheree Alfaro DO  03/05/25 [id number]

## 2025-03-10 ENCOUNTER — APPOINTMENT (OUTPATIENT)
Dept: OBSTETRICS AND GYNECOLOGY | Facility: CLINIC | Age: 25
End: 2025-03-10
Payer: MEDICARE

## 2025-03-10 VITALS — DIASTOLIC BLOOD PRESSURE: 64 MMHG | WEIGHT: 154 LBS | BODY MASS INDEX: 28.17 KG/M2 | SYSTOLIC BLOOD PRESSURE: 110 MMHG

## 2025-03-10 DIAGNOSIS — N90.89 VULVAR LESION: Primary | ICD-10-CM

## 2025-03-10 PROCEDURE — 99213 OFFICE O/P EST LOW 20 MIN: CPT | Performed by: OBSTETRICS & GYNECOLOGY

## 2025-03-10 RX ORDER — DESOXIMETASONE 0.5 MG/G
1 CREAM TOPICAL 2 TIMES DAILY
Qty: 30 G | Refills: 0 | Status: SHIPPED | OUTPATIENT
Start: 2025-03-10 | End: 2026-03-10

## 2025-03-10 ASSESSMENT — ENCOUNTER SYMPTOMS
NAUSEA: 0
ANOREXIA: 0
BACK PAIN: 0
ABDOMINAL PAIN: 0
FREQUENCY: 1
FLANK PAIN: 0
HEMATURIA: 0
DIARRHEA: 0
CHILLS: 0
FEVER: 0
DYSURIA: 0
SORE THROAT: 0
HEADACHES: 0
CONSTIPATION: 1
VOMITING: 0

## 2025-03-10 NOTE — PROGRESS NOTES
"Subjective   Patient ID: Nai Hutton is a 24 y.o. female who presents for No chief complaint on file..  Review of Dr. Hatfield note from 2024,     Master Hatfield MD   Physician  Obstetrics and Gynecology     Progress Notes     Signed     Encounter Date: 2/16/2024    Signed          HPI  Nai Hutton is a 23 y.o. G0 presents for  and pelvic pain, concern for PMDD and hormonal and abnormal bleeding concerns.      Pelvic Pain:  Notes she gets pelvic pain during menses and outside menses, feels pain more on the right than the left.  Keeps a symptom diary for pelvic pain as well as menses and mood symptoms.  Admits to dyspareunia, dyschezia, dysmenorrhea  Mentions she was evaluated for endometriosis in the past by physical exam and laparoscopy.  Laparoscopy was done at the time of tubal ligation and was found to be negative for endometriosis.  Tried pelvic floor therapy with no significant response.  Thinks this exacerbated her pain.  Her pelvic floor therapist informed her she had some hypertonicity on her right pelvic region.  Has seen a GI specialist for pelvic pain workup which yielded no result per patient report.  The only thing that has given her any inclination as to a cause of her pelvic pain is that she has suspects she has Hypermobile Spectrum Disorder versus Yolie-Danlos but no formal diagnosis to date. Reports formal diagnosis is pending cardiology workup     Pt reports h/o PMDD:  She feels \"her body breaks down mentally\" one week before her menses begins  Is uncertain if her menstrual cycles are regular because she has been on birth control long-term  Mentions she gets chest pain a week prior to her menses in addition to the other premenstrual symptoms she experiences.  Notes she was evaluated by a cardiologist with echocardiogram, EKG and Holter monitoring.  Has a past history of pelvic US done which was unremarkable  Has tried a lot of birth pills since teenage years  On and off birth " "Awilda chaidez, last taken on 01/23  On sumatriptan, for migraines. Started Vilazodone, SSRI in 01/2023  Between ages of 12 -13, she had migraines with visual aura occurring once a month before menarche. She notes it was explained to her as both a menstrual migraine and migraine with aura. Described the visual aura as a progressively increasing scotoma.     Chest pain/anxiety:  Endorses transient chest discomfort that she thinks are associated with menses.  Has been told in the past that anxiety is exacerbated by hormones and worsens prior to menses.  Her chest pain, therefore, has been attributed to anxiety in the past.  On inquiring if patient thinks this is anxiety related, she also states that it is.  This has been discussed and evaluated by previous physician.  Currently follows with psychiatry.  On Viibryd for treatment.     Annual Gyn Exam:  S/p Tubal ligation 4/2022  No immediate concern for STIs.  Amenable to GC/CT  Known h/o genital HSV.  Rare outbreaks.  Takes valacyclovir as needed but rarely needs this  - last pap, pap hx: 2020 neg per pt. No h/o of abnormal per pt. collected  - last mammogram, breast hx: n/a  - breast abnormalities: negative for lumps, skin changes, nipple discharge, pain  - last colonoscopy: n/a  - last DEXA scan: n/a     OB hx: G0  GYN hx:   - menarche, menstrual pattern, LMP: unsure of regularity  - Sexual activity/issues, STI protection/history, birth control: History of HSV, BTL in 2022  - HPV vaccine: complete  FH:  No GYN related cancers including breast, ovarian, endometrial, or colon cancer.      ROS notable for fatigue, dry eyes, chest pain,urinary frequency and anxiety  10 point ROS negative except as listed above.      Physical exam  /69   Ht 1.575 m (5' 2\")   Wt 73.9 kg (163 lb)   LMP 01/25/2024 (Exact Date)   BMI 29.81 kg/m²       Physical Exam  Constitutional:       Appearance: Normal appearance.   HENT:      Head: Normocephalic and atraumatic.   Eyes:      " Extraocular Movements: Extraocular movements intact.      Conjunctiva/sclera: Conjunctivae normal.      Pupils: Pupils are equal, round, and reactive to light.   Pulmonary:      Effort: Pulmonary effort is normal.   Chest:   Breasts:     Right: Normal.      Left: Normal.   Abdominal:      General: Abdomen is flat.      Palpations: Abdomen is soft.   Genitourinary:     General: Normal vulva.      Vagina: Normal.      Cervix: Normal.      Uterus: Normal.       Adnexa: Right adnexa normal and left adnexa normal.   Skin:     General: Skin is warm and dry.   Neurological:      General: No focal deficit present.      Mental Status: She is alert.   Psychiatric:         Mood and Affect: Mood normal.         Behavior: Behavior normal.         Thought Content: Thought content normal.         Judgment: Judgment normal.            Assessment/Plan     Pelvic Pain:  Patient seems frustrated given longstanding history of pelvic pain.  Uncertain etiology.  She has had extensive workup for pelvic plain including imaging, laparoscopy, medical management with COCs, pelvic floor physical therapy, GI workup, and patient report of possible hypermobility versus Yolie-Danlos syndrome.  Pelvic exam normal today.  Discussed that we can continue to offer medical management and see if we can find her some relief or we can repeat a workup for pelvic pain and I am happy to investigate further with her.  Pelvic US ordered  Consider a repeat GI evaluation  No indication for repeat laparoscopy at this time as laparoscopy in 2022 was negative.  I suspect this would be low yield but may consider this in the future if workup meets this there  Patient will let me know if she desires further investigation into her pelvic pain     Discussed that she is not a candidate for SARAY's given her history of migraines with aura as a diagnosis on her medical chart.  She has been on COCs on and off long-term.  Discussed elevated risk of stroke in the setting of  migraines with aura.  Discussed potential for alternative diagnosis that would include menstrual migraines.  Referral to a neurologist to evaluate to type of migraine she has.  She would need to have a note on the chart stating that she is a candidate for estrogen-containing therapy before I can prescribe this for her  Discussed progesterone only options including Slynd, norethindrone, Depo, Nexplanon and LNG-IUD.  She may also be a candidate for other nonbirth control dosed progesterone options.  Explained to the patient the high risk and contraindication to COCs with her due to her hx of migraine with aura.     H/o PMDD:  Discussed options for treatment including birth control or with SSRI  Not a candidate for COCs because of past hx of migraines with auras.   Discussed progesterone only options and patient will consider but declines at this time.  Currently follows with psychiatry and is on Viibryd.  Encouraged to discuss PMDD concerns with psychiatry given that she is declining hormonal management at this time     Annual Gyn Exam:  - GC/CT collected  - HPV vaccine: complete  - s/p BTL  - last pap, pap hx: 2020 neg per pt. No h/o of abnormal per pt. collected  - last mammogram, breast hx: n/a  - breast exam is normal. Discussed self breast awareness.  - last colonoscopy: n/a  - last DEXA scan: n/a        Scribe Attestation  By signing my name below, I Shahanajulisa Quintanilla, Scribclint   attest that this documentation has been prepared under the direction and in the presence of Master Hatfield MD.           Electronically signed by Master Hatfield MD at 2/19/2024 10:04 AM      Review of Sheree's note from November,     PIERRE Leary-ANNELIESE, ND   Midwife  Obstetrics and Gynecology     Progress Notes     Signed     Encounter Date: 11/2/2024    Signed     Expand All Collapse All       Subjective  Patient ID: Nai Hutton is a 23 y.o. female who presents for No chief complaint on file.. Vulvar irritation and  pale thickened area of skin on vulva that pt first noticed about 2 yrs ago but it seems to be getting worse.      Female  Problem  The patient's primary symptoms include genital itching and pelvic pain. The patient's pertinent negatives include no genital lesions, genital odor, genital rash, missed menses, vaginal bleeding or vaginal discharge. This is a chronic problem. The current episode started more than 1 month ago. The problem occurs intermittently. The problem has been waxing and waning. The pain is mild. The problem affects the right side. She is not pregnant. Associated symptoms include headaches, painful intercourse and urgency. Pertinent negatives include no abdominal pain, anorexia, back pain, chills, constipation, diarrhea, discolored urine, dysuria, fever, flank pain, frequency, hematuria, joint pain, joint swelling, nausea, rash, sore throat or vomiting. The symptoms are aggravated by intercourse. She is sexually active. It is possible that her partner has an STD. She uses oral contraceptives and tubal ligation for contraception. Her menstrual history has been regular.      PMHx: MULTIPLE ALLERGIES; G0; BTL 2022; PMDD; genital HSV  SocHx: with partner 5 mos condoms not used  ROS: no pelvic pain, no urinary c/o, NAD  Review of Systems   Constitutional:  Negative for chills and fever.   HENT:  Negative for sore throat.    Gastrointestinal:  Negative for abdominal pain, anorexia, constipation, diarrhea, nausea and vomiting.   Genitourinary:  Positive for pelvic pain and urgency. Negative for dysuria, flank pain, frequency, hematuria, missed menses and vaginal discharge.   Musculoskeletal:  Negative for back pain and joint pain.   Skin:  Negative for rash.   Neurological:  Positive for headaches.            Objective  Physical Exam  Constitutional:       Appearance: Normal appearance.   HENT:      Head: Normocephalic.   Pulmonary:      Effort: Pulmonary effort is normal.   Genitourinary:           Comments: Thickened hypopigmented NT skin   Lymphadenopathy:      Lower Body: No right inguinal adenopathy. No left inguinal adenopathy.   Neurological:      Mental Status: She is alert.   Psychiatric:         Behavior: Behavior normal.         Thought Content: Thought content normal.               Assessment/Plan  Diagnoses and all orders for this visit:  Vulvar irritation  -     clobetasol (Temovate) 0.05 % cream; Apply topically 2 times a day.  Hypopigmentation of skin  -     clobetasol (Temovate) 0.05 % cream; Apply topically 2 times a day.     Exam findings and vulvar skin care discussed  Pt advised to RTO for FU in 3 mos with Dr Celi Moore, APRN-CNM, [place] 11/02/24 10:38 AM     Review of recent CT scan,    CT abdomen pelvis w IV contrast  Status: Final result    Study Result    Narrative & Impression  Interpreted By:  William Irvin,   STUDY:  CT ABDOMEN PELVIS W IV CONTRAST;  2/28/2025 8:42 pm      INDICATION:  Signs/Symptoms:RLQ pain.      COMPARISON:  None.      ACCESSION NUMBER(S):  [id number]      ORDERING CLINICIAN:  SHABANA PEOPLES      TECHNIQUE:  CT of the abdomen and pelvis was performed.  Standard contiguous  axial images were obtained at 3 mm slice thickness through the  abdomen and pelvis. Coronal and sagittal reconstructions at 3 mm  slice thickness were performed.      of were administered intravenously without immediate complication.      FINDINGS:  LOWER CHEST:  No consolidation or effusion. Very tiny subcentimeter nodular  scar-like opacity of the right lower lobe in a subpleural location  (series 204, image 1) which measures less than 2 mm in short axis.      ABDOMEN:      LIVER:  Unremarkable      BILE DUCTS:  No significant biliary dilatation.      GALLBLADDER:  No calcified gallstone.      PANCREAS:  Unremarkable      SPLEEN:  Unremarkable.      ADRENAL GLANDS:  Unremarkable.      KIDNEYS AND URETERS:  The bilateral kidneys enhance symmetrically. No  hydroureteronephrosis  or nephroureterolithiasis is identified.      PELVIS:      BLADDER:  Decompressed.      REPRODUCTIVE ORGANS:  No pelvic mass seen. Bilateral adnexal clips are noted.      BOWEL:  Imaged portions of the presumed appendix (series 202, images 40 2-43)  are unremarkable in caliber without evidence of periappendiceal  inflammatory change. No pathologic distention of large or small bowel.      VESSELS:  No evidence of abdominal aortic aneurysm.      PERITONEUM/RETROPERITONEUM/LYMPH NODES:  No ascites or free air, no fluid collection.  No enlarged mesenteric  lymph nodes.      BONES AND ABDOMINAL WALL:  No acute osseous abnormality.      IMPRESSION:  1.  No CT evidence of acute subdiaphragmatic abnormality.          Signed by: William Irvin 2/28/2025 9:26 PM  Dictation workstation:   [id number]        She presents today after having been prescribed clobetasol for a area on the posterior vulva area of linear scarring.  Clobetasol helped but then it caused skin breakdown.  Since she stopped the clobetasol feels better.  She does work on a [employer] so sitting can be irritating.  On exam there is a linear area of white approximate 1.5 cm.  Completely benign appearing.  Discussed excision versus milder steroid cream.  She does tend to form keloids wants avoid any procedures.  Will prescribe Topicort.        Female  Problem  The patient's primary symptoms include genital itching and pelvic pain. The patient's pertinent negatives include no genital lesions, genital odor, genital rash, missed menses, vaginal bleeding or vaginal discharge. This is a recurrent problem. The current episode started more than 1 year ago. The problem occurs intermittently. The problem has been gradually worsening. The pain is mild. The problem affects the right side. She is not pregnant. Associated symptoms include constipation, frequency, joint pain, painful intercourse and urgency. Pertinent negatives include no abdominal  pain, anorexia, back pain, chills, diarrhea, discolored urine, dysuria, fever, flank pain, headaches, hematuria, joint swelling, nausea, rash, sore throat or vomiting. The symptoms are aggravated by tactile pressure. She is sexually active. No, her partner does not have an STD. She uses tubal ligation for contraception. Her menstrual history has been regular.       Review of Systems   Constitutional:  Negative for chills and fever.   HENT:  Negative for sore throat.    Gastrointestinal:  Positive for constipation. Negative for abdominal pain, anorexia, diarrhea, nausea and vomiting.   Genitourinary:  Positive for frequency, pelvic pain, urgency and vaginal pain. Negative for dysuria, flank pain, hematuria, missed menses and vaginal discharge.   Musculoskeletal:  Positive for joint pain. Negative for back pain.   Skin:  Negative for rash.   Neurological:  Negative for headaches.       Objective   Physical Exam  Constitutional:       Appearance: Normal appearance.   Genitourinary:         Comments: Right posterior labia.  Area of white scarred 1.5 cm linear lesion.  Neurological:      Mental Status: She is alert.         Assessment/Plan   Area of scarring, white, linear lesion posterior labia, right side.  Trial of Topicort.  Did discuss possible excision         Urbano Alatorre MD 03/10/25 3:00 PM

## 2025-03-20 DIAGNOSIS — N90.89 VULVAR IRRITATION: Primary | ICD-10-CM

## 2025-03-20 RX ORDER — BETAMETHASONE DIPROPIONATE 0.5 MG/G
CREAM TOPICAL
Qty: 15 G | Refills: 1 | Status: SHIPPED | OUTPATIENT
Start: 2025-03-20

## 2025-04-09 DIAGNOSIS — G43.919 INTRACTABLE MIGRAINE WITHOUT STATUS MIGRAINOSUS, UNSPECIFIED MIGRAINE TYPE: Primary | ICD-10-CM

## 2025-04-09 DIAGNOSIS — Z30.41 ENCOUNTER FOR SURVEILLANCE OF CONTRACEPTIVE PILLS: ICD-10-CM

## 2025-04-09 RX ORDER — TOPIRAMATE 25 MG/1
25 TABLET ORAL 2 TIMES DAILY
Qty: 60 TABLET | Refills: 5 | Status: SHIPPED | OUTPATIENT
Start: 2025-04-09 | End: 2025-10-06

## 2025-04-09 RX ORDER — DROSPIRENONE AND ETHINYL ESTRADIOL 0.02-3(28)
1 KIT ORAL DAILY
Qty: 84 TABLET | Refills: 0 | Status: SHIPPED | OUTPATIENT
Start: 2025-04-09

## 2025-04-09 NOTE — TELEPHONE ENCOUNTER
Okay I sent it to the pharmacy for her, we will start with the low dose of 25mg twice per day and when we follow up in may we can talk about increasing the dose based on symptoms, can you call her and let her know please.

## 2025-04-15 ENCOUNTER — PATIENT MESSAGE (OUTPATIENT)
Dept: PRIMARY CARE | Facility: CLINIC | Age: 25
End: 2025-04-15
Payer: MEDICARE

## 2025-04-15 DIAGNOSIS — R39.9 URINARY SYMPTOM OR SIGN: ICD-10-CM

## 2025-04-19 LAB
APPEARANCE UR: ABNORMAL
BACTERIA UR CULT: NORMAL
BILIRUB UR QL STRIP: NEGATIVE
COLOR UR: YELLOW
GLUCOSE UR QL STRIP: NEGATIVE
HGB UR QL STRIP: NEGATIVE
KETONES UR QL STRIP: NEGATIVE
LEUKOCYTE ESTERASE UR QL STRIP: NEGATIVE
NITRITE UR QL STRIP: NEGATIVE
PH UR STRIP: 8.5 [PH] (ref 5–8)
PROT UR QL STRIP: NEGATIVE
SP GR UR STRIP: 1.01 (ref 1–1.03)

## 2025-05-06 NOTE — PROGRESS NOTES
Subjective   Patient ID: Nai Hutton is a 24 y.o. female who presents for Follow-up (Disability placard renewal; ).    HPI    Acute Neurological Problem  The patient's primary symptoms include clumsiness, focal sensory loss, focal weakness and memory loss. The patient's pertinent negatives include no altered mental status, loss of balance, near-syncope, slurred speech, syncope, visual change or weakness. This is a recurrent problem. The current episode started more than 1 year ago. The neurological problem developed gradually. The problem has been waxing and waning since onset. There was left-sided, right-sided and upper extremity focality noted. Associated symptoms include an auditory change, back pain, fatigue, headaches, light-headedness, neck pain and vertigo. Pertinent negatives include no abdominal pain, aura, bladder incontinence, bowel incontinence, chest pain, confusion, diaphoresis, dizziness, fever, nausea, palpitations, shortness of breath or vomiting. Past treatments include acetaminophen and medication. The treatment provided mild relief.       Feb went to ER. Stomach issues. Then had whole body pains.      Migraines: Deep burning migraine in the base of her skull neck area. Neuro is not concerned of stroke for her with BC, okay to continue.   She states that she knows someone with chiari malformation and states that she feels like this is what is happening to her.   Started gabapentin 200mg tid, she is taking 100mg in the morning and one in the am. She states she is feeling slow and foggy and memory is terrible.   She had follow up with psych they noticed right away that she is different on this medication.   States her eyes are fine, but her prescription keep getting worse over the next couple years      She has seen Neuro for spina bifida occulta when she was 13 and never followed up with this.      Went to cardiology, everything looks okay, was told she might have Mild POTS, having some  heart flutters, no syncope.      PMDD: She gets really bad right before her periods, her moods are very bad and she has had. Seeing Dr. Hatfield, concerns for BC for a long time, concerns for stroke symptoms. Was going to try increasing dose of her Vilazodone 40mg.   Last month she was off BC and she had issues. She had been hospitalized and put on a 72 hour hold before. She will start to have symptoms up to 10 days prior to her periods, she will be suicidal at times.   She had migraine with aura in the past, OBGYN is concerned for stroke risk with DEVYN, She has been on BC since 13. Has appointment with neurology.      Pelvic pain: She had done Pelvic floor PT before for pelvic pain. Has been checked for endometriosis, getting random cramping and pains at times. She did pelvic floor in PT. She was getting more pains at times.     Urinary symptoms: She states she does not fully empty her bladder. She had a transvag ultrasound at 14, she was told that she does not fully empty her bladder. She has some frequency. She had urinary incontinence from holding her urine.  Feels more pressure pushing down on her bladder.    Feels better being off topamax. Had side effects with this, emotional, thinking issues. She was depressed.     Hypermobility syndrome: She did see . They were unclear of ELD. She needs to get echo done. She had this scheduled but it was at [facility]. They did not want to finalize dx. She has fluctuations in her heart rate. She is getting readings of 160 at times with minimal exertion. She does not feel like she is going to pass out.      She is having issues with walking long distances, she experienced a lot of joint pains with her knee, she has feet issues that hurt in her feet. She is easily fatigued with her exertion.      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 She feels sob and she has felt like she is going to pass out with standing up. She was told to be evaluated with  cardiology and possible Echo r/t hypermobility syndrome.      Multiple joint pains: She went and saw rheumatology before. Dr. Jimenez in [facility]. She gets more flair of pain in her body with exercise. Family members have fibromyalgia. She was given flexeril and found that it would interact with her medication and could cause seizure.   She has been doing PT. Doing stretches and this has not been helping too much. She was told that she has jaw issues.   Hypermobility, having issues pain with flexion.   Feels like her head is very heavy all the time. Holding her head up all the time is hard. Has seen a chiropractor in the past, gets minimal relief.   Feels that she is in worse shape, not needing more medications or anything. She used to exercise, but she is is having joint pains, any repetitive motions cause pain. Easily sore.      Ibuprofen 800mg and this is not helping much, would be open to trying flexeril      Bouts of constipation and diarrhea.  Encouraged water intake. Encouraged fiber. She will be constipated for 5 days and then she will have diarrhea. Some mucus in her stool. Slimy. She had grainy stool today. Denies blood, today it was dark. Has always been irregular. She has always gone about 4 days. Worse over the past 4 months. She has tried fiber supplements in the past. She does not drink a lot of water. She has tried to change her diet. She tried factor meals. She a slight accident recently. She has done PT for her pelvic floor.   States that she has not tried a stool softener, she gets diarrhea.   She saw GI before. She thought she had celiac and was checked for this. She took prevacid in the past      Hx of Malignant Hyperthermia     She is debating on disability.      She got in with integrative health, she was recommended comprehensive health.      All other systems reviewed and negative for complaint unless stated above.     Surgery: Hernia repair   Family: MH, Mother   smoker: none     Etoh:  "occasional   Drug use: none       Objective   /77 (BP Location: Left arm, Patient Position: Sitting, BP Cuff Size: Adult)   Pulse 79   Wt 73.1 kg (161 lb 3.2 oz)   BMI 29.48 kg/m²     No data recorded    Physical Exam    Gen: No acute distress, alert and oriented x3, pleasant   HEENT: moist mucous membranes, b/l external auditory canals are clear of debris, TMs within normal limits, no oropharyngeal lesions, eomi, perrla   Neck: thyroid within normal limits, no lymphadenopathy   CV: RRR, normal S1/S2, no murmur   Resp: Clear to auscultation bilaterally, no wheezes or rhonchi appreciated  Abd: soft, nontender, non-distended, no guarding/rigidity, bowel sounds present  Extr: no edema, no calf tenderness  Derm: Skin is warm and dry, no rashes appreciated  Psych: mood is good, affect is congruent, good hygiene, normal speech and eye contact  Neuro: cranial nerves grossly intact, normal gait      Assessment/Plan   Diagnoses and all orders for this visit:  UTI symptoms  -     tamsulosin (Flomax) 0.4 mg 24 hr capsule; Take 1 capsule (0.4 mg) by mouth once daily for 7 days. Do not crush, chew, or split.  Fibromyalgia  -     Disability Placard  -     baclofen (Lioresal) 10 mg tablet; Take 1 tablet (10 mg) by mouth 2 times a day.  Headache disorder  Hypermobility of joint    #Migraine  #Head pain/pressure   Taking gabapentin, encouraged to try a couple more weeks   If intolerable on the low dose call neuro   May be getting MRI in the future     Would like to establish with a comprehensive specialist to \"Put all the pieces together to make sense\"      Recommended follow up with rheumatology      #Hypermobility syndrome   Getting echo for heart issues   Can continue flexeril   Cleared from Cardiology      #PMDD  Taking DEVYN  Has tried coming off of bc and having severe suicidal ideations   Would recommend staying on it until eval with neuro   OBGYN was concerned for Stroke  Trying to work with OBGYN for bc to manage " moods      #Pelvic floor pain   PT referral placed     #Pelvic pain   #UTI symptoms   Had some debris in urine on ultrasound  Trial flomax     #GI issues   Discussed diet  Encouraged water  Encouraged fiber  FODMAP diet provided   May need to see GI in the future      #Concerns for autism   Getting autism assessment in April   She finally found a clinic that will take her insurance   States she is still trying to get a dx   She got dx with ADHD with psychologist  Was found online, he was with [facility]  Her psychiatrist NP  Dr. Reynold Dennison   She is interested in being treated for ADHD  States her psych will not prescribe medications for her  Will request records to prove her diagnosis, otherwise she will have to follow up with psych about this.     Wellbutrin helps with her adhd also helps with moods.   Has been on this for years.     #Lichen sclerosis   Saw dr. Alatorre   Had topical for as needed use      #HCM  Following with OBGYN, last pap 2/2024

## 2025-05-12 ENCOUNTER — APPOINTMENT (OUTPATIENT)
Dept: PRIMARY CARE | Facility: CLINIC | Age: 25
End: 2025-05-12
Payer: MEDICARE

## 2025-05-12 VITALS
DIASTOLIC BLOOD PRESSURE: 77 MMHG | BODY MASS INDEX: 29.48 KG/M2 | HEART RATE: 79 BPM | WEIGHT: 161.2 LBS | SYSTOLIC BLOOD PRESSURE: 111 MMHG

## 2025-05-12 DIAGNOSIS — R51.9 HEADACHE DISORDER: ICD-10-CM

## 2025-05-12 DIAGNOSIS — M79.7 FIBROMYALGIA: ICD-10-CM

## 2025-05-12 DIAGNOSIS — M24.9 HYPERMOBILITY OF JOINT: ICD-10-CM

## 2025-05-12 DIAGNOSIS — R39.9 UTI SYMPTOMS: Primary | ICD-10-CM

## 2025-05-12 PROBLEM — F31.81 BIPOLAR 2 DISORDER (MULTI): Status: RESOLVED | Noted: 2019-11-13 | Resolved: 2025-05-12

## 2025-05-12 PROCEDURE — 99214 OFFICE O/P EST MOD 30 MIN: CPT | Performed by: REGISTERED NURSE

## 2025-05-12 RX ORDER — TAMSULOSIN HYDROCHLORIDE 0.4 MG/1
0.4 CAPSULE ORAL DAILY
Qty: 7 CAPSULE | Refills: 0 | Status: SHIPPED | OUTPATIENT
Start: 2025-05-12 | End: 2025-05-19

## 2025-05-12 RX ORDER — BACLOFEN 10 MG/1
10 TABLET ORAL 2 TIMES DAILY
Qty: 60 TABLET | Refills: 0 | Status: SHIPPED | OUTPATIENT
Start: 2025-05-12 | End: 2025-06-11

## 2025-06-23 ENCOUNTER — APPOINTMENT (OUTPATIENT)
Dept: OTOLARYNGOLOGY | Facility: CLINIC | Age: 25
End: 2025-06-23
Payer: MEDICARE

## 2025-06-23 DIAGNOSIS — J34.3 NASAL TURBINATE HYPERTROPHY: ICD-10-CM

## 2025-06-23 DIAGNOSIS — J34.89 NASAL CRUSTING: ICD-10-CM

## 2025-06-23 DIAGNOSIS — J32.9 CLINICAL SINUSITIS: Primary | ICD-10-CM

## 2025-06-23 DIAGNOSIS — J34.89 SINUS PRESSURE: ICD-10-CM

## 2025-06-23 DIAGNOSIS — J34.2 DEVIATED NASAL SEPTUM: ICD-10-CM

## 2025-06-23 RX ORDER — MUPIROCIN 20 MG/G
OINTMENT TOPICAL
Qty: 22 G | Refills: 3 | Status: SHIPPED | OUTPATIENT
Start: 2025-06-23

## 2025-06-23 RX ORDER — AMOXICILLIN AND CLAVULANATE POTASSIUM 875; 125 MG/1; MG/1
1 TABLET, FILM COATED ORAL 2 TIMES DAILY
Qty: 20 TABLET | Refills: 0 | Status: SHIPPED | OUTPATIENT
Start: 2025-06-23 | End: 2025-07-03

## 2025-06-23 NOTE — PROGRESS NOTES
"History Of Present Illness    06.23.2025: She reports frequent sinus pressure and headaches and may be exposed to mold in her workplace.    She has chronic nasal crusting, which worsened with prolonged use of intranasal steroid spray (used for approximately 2 years). She also reports longstanding difficulty breathing through the right side of her nose.    For at least several months, she has had greenish, foul-smelling nasal discharge. She experiences frequent ear popping. She has known allergies to grass (high), tree pollens, and weed pollens.    On examination:  The nasal septum is deviated to the left. Inferior turbinates are moderately congested. Crusting is present along the right anterior nasal septal mucosa. No purulent discharge is visible. An MRI from last year demonstrated scattered bilateral ethmoid sinus inflammation.    Plan:  CT of the sinuses for detailed assessment.  mupirocin ointment for nasal crusting.  oral amoxicillin-clavulanate (generic Augmentin) for greenish nasal discharge.          _________________________________________________________________    09.16.2024 Nai Hutton is a 24 y.o. female presenting with: \"Referral from urgent care, ear trouble\". She is kindly referred by Dr. Viktoriya Mosqueda, APRN-CNP.    In December 2023, she felt like she had an ear infection. She has used an oral antibiotic, she developed a rash so she stopped alton. Couple of months later (May 2024?) still felt like her ears are plugged. Her tinnitus was getting worse. She went to urgent care, her ears were normal on examination. She was recommended to use flonase and zyrtec. She has not noticed any improvement. Her ears feel clogged and off and on they crack.     Her nose feels congested frequently.   She also has sinus pressure. Nasal dryness (+).  She is not a smoker.     On examination, TMs look intact bilaterally.   Tenderness at right postauricular sulcus (+).  Dryness crusting (+) over nasal " septum mucosa.     My impression she may have ETD.  Allergic rhinitis, nasal congestion    Plan:  1- allergy test  2- hearing test         Past Medical History  She has no past medical history on file.    Surgical History  She has a past surgical history that includes Hernia repair and Tubal ligation.     Social History  She reports that she has never smoked. She has never been exposed to tobacco smoke. She has never used smokeless tobacco. She reports current alcohol use of about 1.0 standard drink of alcohol per week. She reports that she does not currently use drugs after having used the following drugs: Marijuana.    Family History  Family History   Problem Relation Name Age of Onset    Depression Mother Allyson     Bipolar disorder Maternal Grandmother          Allergies  Halothane, Inhaled anesthetics (halogen based), Nitrous oxide, Propofol, Sevoflurane, Aloe, Doxycycline, and Sulfa (sulfonamide antibiotics)    Review of Systems   Difficulty hearing  Ear pain  Tinnitus  Ears feel full  Sinus pressure  Sore throat  Dry mouth/mouth breathing  Dizziness upon standing  Heartburn  Nausea  Change in appetite  Headache  Migraines     Physical Exam    General appearance: Healthy-appearing, well-nourished, well groomed, in no acute distress.     Head and Face: Atraumatic with no masses, lesions, or scarring.      Salivary glands: No tenderness of the parotid glands or parotid masses.     No tenderness of the submandibular glands or submandibular masses.      Facial strength: Normal strength and symmetry, no synkinesis or facial tic.     Eyes: Conjunctivas look non-hyperemic bilaterally    Ears: Bilaterally ear canals look normal. Tympanic membranes look intact, no hyperemia, fluid or retraction. Hearing grossly normal.      Nose: Dryness (+) crusting, Left inferior turbinate looks congested.     Oral Cavity/Mouth: Lips and tongue look normal.     Throat: No postnasal discharge. No tonsil hypertrophy. No  hyperemia.    Neck: Symmetrical, trachea midline.     Pulmonary: Normal respiratory effort.     Lymphatic: No palpable pathologic lymph nodes at neck.     Neurological/Psychiatric Orientation to person, place, and time: Normal.     Mood and affect: Normal.      Extremities: No clubbing.     Skin: No significant skin lesions were noted at face or neck        Procedure    NASAL ENDOSCOPY 06.23.2025  Procedure was explained to the patient. Verbal consent was obtained. 4% lidocaine was sprayed into patients nasal cavities. 0 degree nasal endoscope was gently advanced through patient's nasal cavities. On examination, patient's septum was deviated to left posteriorly. Mucosa looked  pale. Crusting was (+) anteriorly at right. No polyps or purulent secretions were observed. Middle meatus looked open on both sides. No mass or ulceration was found at nasopharynx.          Last Recorded Vitals  There were no vitals taken for this visit.    Relevant Results    Assessment and Plan:    06.23.2025: She reports frequent sinus pressure and headaches and may be exposed to mold in her workplace.    She has chronic nasal crusting, which worsened with prolonged use of intranasal steroid spray (used for approximately 2 years). She also reports longstanding difficulty breathing through the right side of her nose.    For at least several months, she has had greenish, foul-smelling nasal discharge. She experiences frequent ear popping. She has known allergies to grass (high), tree pollens, and weed pollens.    On examination:  The nasal septum is deviated to the left. Inferior turbinates are moderately congested. Crusting is present along the right anterior nasal septal mucosa. No purulent discharge is visible. An MRI from last year demonstrated scattered bilateral ethmoid sinus inflammation.    Plan:  CT of the sinuses for detailed assessment.  mupirocin ointment for nasal crusting.  oral amoxicillin-clavulanate (generic Augmentin) for  "greenish nasal discharge.  _________________________________________________________________    Nai Hutton is a 23 y.o. female presenting with: \"Referral from urgent care, ear trouble\". She is kindly referred by Dr. Viktoriya Mosqueda, APRN-CNP.    In December 2023, she felt like she had an ear infection. She has used an oral antibiotic, she developed a rash so she stopped alton. Couple of months later (May 2024?) still felt like her ears are plugged. Her tinnitus was getting worse. She went to urgent care, her ears were normal on examination. She was recommended to use flonase and zyrtec. She has not noticed any improvement. Her ears feel clogged and off and on they crack.     Her nose feels congested frequently.   She also has sinus pressure. Nasal dryness (+).  She is not a smoker.     On examination, TMs look intact bilaterally.   Tenderness at right postauricular sulcus (+).  Dryness crusting (+) over nasal septum mucosa.     My impression she may have ETD.  Allergic rhinitis, nasal congestion    Plan:  1- allergy test  2- hearing test      Bre Mujica  Otolaryngology - Head & Neck Surgery  "

## 2025-06-24 DIAGNOSIS — Z30.41 ENCOUNTER FOR SURVEILLANCE OF CONTRACEPTIVE PILLS: ICD-10-CM

## 2025-06-24 RX ORDER — DROSPIRENONE AND ETHINYL ESTRADIOL 0.02-3(28)
1 KIT ORAL DAILY
Qty: 84 TABLET | Refills: 0 | Status: SHIPPED | OUTPATIENT
Start: 2025-06-24

## 2025-07-14 ENCOUNTER — APPOINTMENT (OUTPATIENT)
Dept: OTOLARYNGOLOGY | Facility: CLINIC | Age: 25
End: 2025-07-14
Payer: MEDICARE

## 2025-07-22 DIAGNOSIS — Z30.41 ENCOUNTER FOR SURVEILLANCE OF CONTRACEPTIVE PILLS: ICD-10-CM

## 2025-07-22 RX ORDER — DROSPIRENONE AND ETHINYL ESTRADIOL 0.02-3(28)
1 KIT ORAL DAILY
Qty: 84 TABLET | Refills: 0 | Status: SHIPPED | OUTPATIENT
Start: 2025-07-22

## 2025-08-13 ENCOUNTER — APPOINTMENT (OUTPATIENT)
Dept: PRIMARY CARE | Facility: CLINIC | Age: 25
End: 2025-08-13
Payer: MEDICARE

## 2026-01-07 ENCOUNTER — APPOINTMENT (OUTPATIENT)
Dept: RHEUMATOLOGY | Facility: CLINIC | Age: 26
End: 2026-01-07
Payer: MEDICARE